# Patient Record
Sex: MALE | Race: WHITE | NOT HISPANIC OR LATINO | Employment: OTHER | URBAN - METROPOLITAN AREA
[De-identification: names, ages, dates, MRNs, and addresses within clinical notes are randomized per-mention and may not be internally consistent; named-entity substitution may affect disease eponyms.]

---

## 2020-01-01 ENCOUNTER — APPOINTMENT (OUTPATIENT)
Dept: GENERAL RADIOLOGY | Facility: HOSPITAL | Age: 85
End: 2020-01-01

## 2020-01-01 ENCOUNTER — APPOINTMENT (OUTPATIENT)
Dept: CT IMAGING | Facility: HOSPITAL | Age: 85
End: 2020-01-01

## 2020-01-01 ENCOUNTER — HOSPITAL ENCOUNTER (INPATIENT)
Facility: HOSPITAL | Age: 85
LOS: 3 days | End: 2020-03-23
Attending: INTERNAL MEDICINE | Admitting: INTERNAL MEDICINE

## 2020-01-01 ENCOUNTER — HOSPITAL ENCOUNTER (INPATIENT)
Facility: HOSPITAL | Age: 85
LOS: 4 days | End: 2020-03-20
Attending: EMERGENCY MEDICINE | Admitting: INTERNAL MEDICINE

## 2020-01-01 VITALS
HEIGHT: 65 IN | WEIGHT: 120 LBS | DIASTOLIC BLOOD PRESSURE: 96 MMHG | SYSTOLIC BLOOD PRESSURE: 142 MMHG | TEMPERATURE: 97.7 F | BODY MASS INDEX: 19.99 KG/M2 | OXYGEN SATURATION: 98 % | RESPIRATION RATE: 16 BRPM | HEART RATE: 117 BPM

## 2020-01-01 VITALS — OXYGEN SATURATION: 96 % | SYSTOLIC BLOOD PRESSURE: 111 MMHG | DIASTOLIC BLOOD PRESSURE: 66 MMHG | TEMPERATURE: 100.6 F

## 2020-01-01 DIAGNOSIS — G91.9 HYDROCEPHALUS, UNSPECIFIED TYPE (HCC): ICD-10-CM

## 2020-01-01 DIAGNOSIS — R79.89 ELEVATED LACTIC ACID LEVEL: ICD-10-CM

## 2020-01-01 DIAGNOSIS — I60.9 SAH (SUBARACHNOID HEMORRHAGE) (HCC): Primary | ICD-10-CM

## 2020-01-01 DIAGNOSIS — D72.829 LEUKOCYTOSIS, UNSPECIFIED TYPE: ICD-10-CM

## 2020-01-01 DIAGNOSIS — R73.9 HYPERGLYCEMIA: ICD-10-CM

## 2020-01-01 DIAGNOSIS — R77.8 ELEVATED TROPONIN: ICD-10-CM

## 2020-01-01 DIAGNOSIS — D64.9 CHRONIC ANEMIA: ICD-10-CM

## 2020-01-01 LAB
ABO GROUP BLD: NORMAL
ALBUMIN SERPL-MCNC: 3.4 G/DL (ref 3.5–5.2)
ALBUMIN/GLOB SERPL: 1.4 G/DL
ALP SERPL-CCNC: 104 U/L (ref 39–117)
ALT SERPL W P-5'-P-CCNC: 37 U/L (ref 1–41)
ANION GAP SERPL CALCULATED.3IONS-SCNC: 11.7 MMOL/L (ref 5–15)
APTT PPP: 31.7 SECONDS (ref 22.7–35.4)
AST SERPL-CCNC: 37 U/L (ref 1–40)
BASOPHILS # BLD AUTO: 0.08 10*3/MM3 (ref 0–0.2)
BASOPHILS NFR BLD AUTO: 0.5 % (ref 0–1.5)
BILIRUB SERPL-MCNC: 0.4 MG/DL (ref 0.2–1.2)
BLD GP AB SCN SERPL QL: NEGATIVE
BUN BLD-MCNC: 15 MG/DL (ref 8–23)
BUN/CREAT SERPL: 14.7 (ref 7–25)
CALCIUM SPEC-SCNC: 8.9 MG/DL (ref 8.6–10.5)
CHLORIDE SERPL-SCNC: 104 MMOL/L (ref 98–107)
CO2 SERPL-SCNC: 25.3 MMOL/L (ref 22–29)
CREAT BLD-MCNC: 1.02 MG/DL (ref 0.76–1.27)
D-LACTATE SERPL-SCNC: 3.5 MMOL/L (ref 0.5–2)
DEPRECATED RDW RBC AUTO: 47.7 FL (ref 37–54)
EOSINOPHIL # BLD AUTO: 0.1 10*3/MM3 (ref 0–0.4)
EOSINOPHIL NFR BLD AUTO: 0.7 % (ref 0.3–6.2)
ERYTHROCYTE [DISTWIDTH] IN BLOOD BY AUTOMATED COUNT: 14 % (ref 12.3–15.4)
GFR SERPL CREATININE-BSD FRML MDRD: 69 ML/MIN/1.73
GLOBULIN UR ELPH-MCNC: 2.5 GM/DL
GLUCOSE BLD-MCNC: 220 MG/DL (ref 65–99)
GLUCOSE BLDC GLUCOMTR-MCNC: 199 MG/DL (ref 70–130)
HCT VFR BLD AUTO: 36.2 % (ref 37.5–51)
HGB BLD-MCNC: 12.1 G/DL (ref 13–17.7)
IMM GRANULOCYTES # BLD AUTO: 0.21 10*3/MM3 (ref 0–0.05)
IMM GRANULOCYTES NFR BLD AUTO: 1.4 % (ref 0–0.5)
INR PPP: 1.08 (ref 0.9–1.1)
LACTATE HOLD SPECIMEN: NORMAL
LYMPHOCYTES # BLD AUTO: 2.51 10*3/MM3 (ref 0.7–3.1)
LYMPHOCYTES NFR BLD AUTO: 16.9 % (ref 19.6–45.3)
MCH RBC QN AUTO: 31.8 PG (ref 26.6–33)
MCHC RBC AUTO-ENTMCNC: 33.4 G/DL (ref 31.5–35.7)
MCV RBC AUTO: 95.3 FL (ref 79–97)
MONOCYTES # BLD AUTO: 1.29 10*3/MM3 (ref 0.1–0.9)
MONOCYTES NFR BLD AUTO: 8.7 % (ref 5–12)
NEUTROPHILS # BLD AUTO: 10.67 10*3/MM3 (ref 1.7–7)
NEUTROPHILS NFR BLD AUTO: 71.8 % (ref 42.7–76)
NRBC BLD AUTO-RTO: 0.1 /100 WBC (ref 0–0.2)
PLATELET # BLD AUTO: 228 10*3/MM3 (ref 140–450)
PMV BLD AUTO: 9.6 FL (ref 6–12)
POTASSIUM BLD-SCNC: 3.9 MMOL/L (ref 3.5–5.2)
PROT SERPL-MCNC: 5.9 G/DL (ref 6–8.5)
PROTHROMBIN TIME: 13.7 SECONDS (ref 11.7–14.2)
RBC # BLD AUTO: 3.8 10*6/MM3 (ref 4.14–5.8)
RH BLD: POSITIVE
SODIUM BLD-SCNC: 141 MMOL/L (ref 136–145)
T&S EXPIRATION DATE: NORMAL
TROPONIN T SERPL-MCNC: 0.17 NG/ML (ref 0–0.03)
WBC NRBC COR # BLD: 14.86 10*3/MM3 (ref 3.4–10.8)

## 2020-01-01 PROCEDURE — 85610 PROTHROMBIN TIME: CPT | Performed by: EMERGENCY MEDICINE

## 2020-01-01 PROCEDURE — 25010000002 MORPHINE PER 10 MG: Performed by: INTERNAL MEDICINE

## 2020-01-01 PROCEDURE — 92526 ORAL FUNCTION THERAPY: CPT

## 2020-01-01 PROCEDURE — 25010000002 LORAZEPAM PER 2 MG: Performed by: INTERNAL MEDICINE

## 2020-01-01 PROCEDURE — 86850 RBC ANTIBODY SCREEN: CPT | Performed by: EMERGENCY MEDICINE

## 2020-01-01 PROCEDURE — 85025 COMPLETE CBC W/AUTO DIFF WBC: CPT | Performed by: EMERGENCY MEDICINE

## 2020-01-01 PROCEDURE — 99222 1ST HOSP IP/OBS MODERATE 55: CPT | Performed by: PSYCHIATRY & NEUROLOGY

## 2020-01-01 PROCEDURE — 71045 X-RAY EXAM CHEST 1 VIEW: CPT

## 2020-01-01 PROCEDURE — 93005 ELECTROCARDIOGRAM TRACING: CPT | Performed by: EMERGENCY MEDICINE

## 2020-01-01 PROCEDURE — 70450 CT HEAD/BRAIN W/O DYE: CPT

## 2020-01-01 PROCEDURE — 83605 ASSAY OF LACTIC ACID: CPT | Performed by: EMERGENCY MEDICINE

## 2020-01-01 PROCEDURE — 84484 ASSAY OF TROPONIN QUANT: CPT | Performed by: EMERGENCY MEDICINE

## 2020-01-01 PROCEDURE — 99284 EMERGENCY DEPT VISIT MOD MDM: CPT | Performed by: NURSE PRACTITIONER

## 2020-01-01 PROCEDURE — 86901 BLOOD TYPING SEROLOGIC RH(D): CPT | Performed by: EMERGENCY MEDICINE

## 2020-01-01 PROCEDURE — 86900 BLOOD TYPING SEROLOGIC ABO: CPT | Performed by: EMERGENCY MEDICINE

## 2020-01-01 PROCEDURE — 85730 THROMBOPLASTIN TIME PARTIAL: CPT | Performed by: EMERGENCY MEDICINE

## 2020-01-01 PROCEDURE — 99231 SBSQ HOSP IP/OBS SF/LOW 25: CPT | Performed by: INTERNAL MEDICINE

## 2020-01-01 PROCEDURE — 99238 HOSP IP/OBS DSCHRG MGMT 30/<: CPT | Performed by: INTERNAL MEDICINE

## 2020-01-01 PROCEDURE — 99232 SBSQ HOSP IP/OBS MODERATE 35: CPT | Performed by: INTERNAL MEDICINE

## 2020-01-01 PROCEDURE — 99239 HOSP IP/OBS DSCHRG MGMT >30: CPT | Performed by: INTERNAL MEDICINE

## 2020-01-01 PROCEDURE — 80053 COMPREHEN METABOLIC PANEL: CPT | Performed by: EMERGENCY MEDICINE

## 2020-01-01 PROCEDURE — 99285 EMERGENCY DEPT VISIT HI MDM: CPT

## 2020-01-01 PROCEDURE — 99222 1ST HOSP IP/OBS MODERATE 55: CPT | Performed by: INTERNAL MEDICINE

## 2020-01-01 PROCEDURE — 93010 ELECTROCARDIOGRAM REPORT: CPT | Performed by: INTERNAL MEDICINE

## 2020-01-01 PROCEDURE — 99221 1ST HOSP IP/OBS SF/LOW 40: CPT | Performed by: INTERNAL MEDICINE

## 2020-01-01 PROCEDURE — 92610 EVALUATE SWALLOWING FUNCTION: CPT

## 2020-01-01 PROCEDURE — 82962 GLUCOSE BLOOD TEST: CPT

## 2020-01-01 RX ORDER — PANTOPRAZOLE SODIUM 40 MG/1
40 TABLET, DELAYED RELEASE ORAL EVERY MORNING
Status: CANCELLED | OUTPATIENT
Start: 2020-01-01

## 2020-01-01 RX ORDER — MORPHINE SULFATE 20 MG/ML
10 SOLUTION ORAL
Status: CANCELLED | OUTPATIENT
Start: 2020-01-01 | End: 2020-03-26

## 2020-01-01 RX ORDER — MORPHINE SULFATE 10 MG/ML
6 INJECTION INTRAMUSCULAR; INTRAVENOUS; SUBCUTANEOUS
Status: DISCONTINUED | OUTPATIENT
Start: 2020-01-01 | End: 2020-03-24 | Stop reason: HOSPADM

## 2020-01-01 RX ORDER — LORAZEPAM 2 MG/ML
2 CONCENTRATE ORAL
Status: DISCONTINUED | OUTPATIENT
Start: 2020-01-01 | End: 2020-01-01 | Stop reason: HOSPADM

## 2020-01-01 RX ORDER — LORAZEPAM 2 MG/ML
0.5 CONCENTRATE ORAL
Status: DISCONTINUED | OUTPATIENT
Start: 2020-01-01 | End: 2020-03-24 | Stop reason: HOSPADM

## 2020-01-01 RX ORDER — LEVOTHYROXINE SODIUM 0.07 MG/1
75 TABLET ORAL
Status: DISCONTINUED | OUTPATIENT
Start: 2020-01-01 | End: 2020-01-01 | Stop reason: HOSPADM

## 2020-01-01 RX ORDER — MORPHINE SULFATE 4 MG/ML
4 INJECTION, SOLUTION INTRAMUSCULAR; INTRAVENOUS
Status: CANCELLED | OUTPATIENT
Start: 2020-01-01 | End: 2020-03-26

## 2020-01-01 RX ORDER — HYDROMORPHONE HYDROCHLORIDE 1 MG/ML
0.5 INJECTION, SOLUTION INTRAMUSCULAR; INTRAVENOUS; SUBCUTANEOUS
Status: DISCONTINUED | OUTPATIENT
Start: 2020-01-01 | End: 2020-03-24 | Stop reason: HOSPADM

## 2020-01-01 RX ORDER — MORPHINE SULFATE 2 MG/ML
4 INJECTION, SOLUTION INTRAMUSCULAR; INTRAVENOUS ONCE
Status: COMPLETED | OUTPATIENT
Start: 2020-01-01 | End: 2020-01-01

## 2020-01-01 RX ORDER — MORPHINE SULFATE 20 MG/ML
5 SOLUTION ORAL
Status: CANCELLED | OUTPATIENT
Start: 2020-01-01 | End: 2020-03-26

## 2020-01-01 RX ORDER — MORPHINE SULFATE 10 MG/ML
6 INJECTION INTRAMUSCULAR; INTRAVENOUS; SUBCUTANEOUS
Status: DISCONTINUED | OUTPATIENT
Start: 2020-01-01 | End: 2020-01-01 | Stop reason: HOSPADM

## 2020-01-01 RX ORDER — LORAZEPAM 2 MG/ML
1 INJECTION INTRAMUSCULAR
Status: DISCONTINUED | OUTPATIENT
Start: 2020-01-01 | End: 2020-03-24 | Stop reason: HOSPADM

## 2020-01-01 RX ORDER — ACETAMINOPHEN 160 MG/5ML
650 SOLUTION ORAL EVERY 4 HOURS PRN
Status: DISCONTINUED | OUTPATIENT
Start: 2020-01-01 | End: 2020-03-24 | Stop reason: HOSPADM

## 2020-01-01 RX ORDER — LORAZEPAM 2 MG/ML
1 INJECTION INTRAMUSCULAR
Status: CANCELLED | OUTPATIENT
Start: 2020-01-01 | End: 2020-03-26

## 2020-01-01 RX ORDER — SCOLOPAMINE TRANSDERMAL SYSTEM 1 MG/1
1 PATCH, EXTENDED RELEASE TRANSDERMAL
Status: DISCONTINUED | OUTPATIENT
Start: 2020-01-01 | End: 2020-01-01 | Stop reason: HOSPADM

## 2020-01-01 RX ORDER — LORAZEPAM 2 MG/ML
2 CONCENTRATE ORAL
Status: DISCONTINUED | OUTPATIENT
Start: 2020-01-01 | End: 2020-03-24 | Stop reason: HOSPADM

## 2020-01-01 RX ORDER — CETIRIZINE HYDROCHLORIDE 10 MG/1
10 TABLET ORAL DAILY
Status: ON HOLD | COMMUNITY
End: 2020-01-01

## 2020-01-01 RX ORDER — TAMSULOSIN HYDROCHLORIDE 0.4 MG/1
0.4 CAPSULE ORAL DAILY
Status: CANCELLED | OUTPATIENT
Start: 2020-01-01

## 2020-01-01 RX ORDER — MORPHINE SULFATE 2 MG/ML
2 INJECTION, SOLUTION INTRAMUSCULAR; INTRAVENOUS
Status: DISCONTINUED | OUTPATIENT
Start: 2020-01-01 | End: 2020-01-01 | Stop reason: HOSPADM

## 2020-01-01 RX ORDER — LORAZEPAM 2 MG/ML
1 CONCENTRATE ORAL
Status: CANCELLED | OUTPATIENT
Start: 2020-01-01 | End: 2020-03-26

## 2020-01-01 RX ORDER — SCOLOPAMINE TRANSDERMAL SYSTEM 1 MG/1
1 PATCH, EXTENDED RELEASE TRANSDERMAL
Status: CANCELLED | OUTPATIENT
Start: 2020-01-01

## 2020-01-01 RX ORDER — LORAZEPAM 2 MG/ML
1 CONCENTRATE ORAL
Status: DISCONTINUED | OUTPATIENT
Start: 2020-01-01 | End: 2020-03-24 | Stop reason: HOSPADM

## 2020-01-01 RX ORDER — ACETAMINOPHEN 325 MG/1
650 TABLET ORAL EVERY 4 HOURS PRN
Status: DISCONTINUED | OUTPATIENT
Start: 2020-01-01 | End: 2020-03-24 | Stop reason: HOSPADM

## 2020-01-01 RX ORDER — LEVOTHYROXINE SODIUM 0.07 MG/1
75 TABLET ORAL
Status: CANCELLED | OUTPATIENT
Start: 2020-01-01

## 2020-01-01 RX ORDER — MORPHINE SULFATE 2 MG/ML
2 INJECTION, SOLUTION INTRAMUSCULAR; INTRAVENOUS
Status: CANCELLED | OUTPATIENT
Start: 2020-01-01 | End: 2020-03-26

## 2020-01-01 RX ORDER — LORAZEPAM 2 MG/ML
2 INJECTION INTRAMUSCULAR
Status: DISCONTINUED | OUTPATIENT
Start: 2020-01-01 | End: 2020-03-24 | Stop reason: HOSPADM

## 2020-01-01 RX ORDER — LORAZEPAM 2 MG/ML
2 INJECTION INTRAMUSCULAR
Status: CANCELLED | OUTPATIENT
Start: 2020-01-01 | End: 2020-03-26

## 2020-01-01 RX ORDER — HYDROMORPHONE HYDROCHLORIDE 1 MG/ML
0.5 INJECTION, SOLUTION INTRAMUSCULAR; INTRAVENOUS; SUBCUTANEOUS
Status: DISCONTINUED | OUTPATIENT
Start: 2020-01-01 | End: 2020-01-01 | Stop reason: HOSPADM

## 2020-01-01 RX ORDER — ACETAMINOPHEN 160 MG/5ML
650 SOLUTION ORAL EVERY 4 HOURS PRN
Status: DISCONTINUED | OUTPATIENT
Start: 2020-01-01 | End: 2020-01-01 | Stop reason: HOSPADM

## 2020-01-01 RX ORDER — SODIUM CHLORIDE 0.9 % (FLUSH) 0.9 %
10 SYRINGE (ML) INJECTION AS NEEDED
Status: DISCONTINUED | OUTPATIENT
Start: 2020-01-01 | End: 2020-01-01 | Stop reason: HOSPADM

## 2020-01-01 RX ORDER — SENNA AND DOCUSATE SODIUM 50; 8.6 MG/1; MG/1
2 TABLET, FILM COATED ORAL NIGHTLY
Status: DISCONTINUED | OUTPATIENT
Start: 2020-01-01 | End: 2020-01-01

## 2020-01-01 RX ORDER — MORPHINE SULFATE 20 MG/ML
5 SOLUTION ORAL
Status: DISCONTINUED | OUTPATIENT
Start: 2020-01-01 | End: 2020-01-01 | Stop reason: HOSPADM

## 2020-01-01 RX ORDER — LORAZEPAM 2 MG/ML
0.5 INJECTION INTRAMUSCULAR
Status: DISCONTINUED | OUTPATIENT
Start: 2020-01-01 | End: 2020-01-01 | Stop reason: HOSPADM

## 2020-01-01 RX ORDER — LORAZEPAM 2 MG/ML
1 INJECTION INTRAMUSCULAR
Status: DISCONTINUED | OUTPATIENT
Start: 2020-01-01 | End: 2020-01-01 | Stop reason: HOSPADM

## 2020-01-01 RX ORDER — HYDROMORPHONE HCL 110MG/55ML
1.5 PATIENT CONTROLLED ANALGESIA SYRINGE INTRAVENOUS
Status: DISCONTINUED | OUTPATIENT
Start: 2020-01-01 | End: 2020-03-24 | Stop reason: HOSPADM

## 2020-01-01 RX ORDER — ACETAMINOPHEN 325 MG/1
650 TABLET ORAL EVERY 4 HOURS PRN
Status: CANCELLED | OUTPATIENT
Start: 2020-01-01

## 2020-01-01 RX ORDER — ACETAMINOPHEN 650 MG/1
650 SUPPOSITORY RECTAL EVERY 4 HOURS PRN
Status: DISCONTINUED | OUTPATIENT
Start: 2020-01-01 | End: 2020-01-01 | Stop reason: HOSPADM

## 2020-01-01 RX ORDER — MORPHINE SULFATE 2 MG/ML
2 INJECTION, SOLUTION INTRAMUSCULAR; INTRAVENOUS
Status: DISCONTINUED | OUTPATIENT
Start: 2020-01-01 | End: 2020-03-24 | Stop reason: HOSPADM

## 2020-01-01 RX ORDER — LORAZEPAM 2 MG/ML
0.5 INJECTION INTRAMUSCULAR
Status: DISCONTINUED | OUTPATIENT
Start: 2020-01-01 | End: 2020-03-24 | Stop reason: HOSPADM

## 2020-01-01 RX ORDER — DIPHENOXYLATE HYDROCHLORIDE AND ATROPINE SULFATE 2.5; .025 MG/1; MG/1
1 TABLET ORAL
Status: CANCELLED | OUTPATIENT
Start: 2020-01-01

## 2020-01-01 RX ORDER — MORPHINE SULFATE 2 MG/ML
4 INJECTION, SOLUTION INTRAMUSCULAR; INTRAVENOUS
Status: DISCONTINUED | OUTPATIENT
Start: 2020-01-01 | End: 2020-01-01 | Stop reason: HOSPADM

## 2020-01-01 RX ORDER — AMOXICILLIN 250 MG
2 CAPSULE ORAL DAILY
Status: CANCELLED | OUTPATIENT
Start: 2020-01-01

## 2020-01-01 RX ORDER — LORAZEPAM 2 MG/ML
0.5 INJECTION INTRAMUSCULAR
Status: CANCELLED | OUTPATIENT
Start: 2020-01-01 | End: 2020-03-26

## 2020-01-01 RX ORDER — MORPHINE SULFATE 4 MG/ML
4 INJECTION, SOLUTION INTRAMUSCULAR; INTRAVENOUS
Status: DISCONTINUED | OUTPATIENT
Start: 2020-01-01 | End: 2020-03-24 | Stop reason: HOSPADM

## 2020-01-01 RX ORDER — POLYETHYLENE GLYCOL 3350 17 G/17G
17 POWDER, FOR SOLUTION ORAL DAILY
Status: CANCELLED | OUTPATIENT
Start: 2020-01-01

## 2020-01-01 RX ORDER — MORPHINE SULFATE 20 MG/ML
5 SOLUTION ORAL
Status: DISCONTINUED | OUTPATIENT
Start: 2020-01-01 | End: 2020-03-24 | Stop reason: HOSPADM

## 2020-01-01 RX ORDER — MORPHINE SULFATE 20 MG/ML
20 SOLUTION ORAL
Status: DISCONTINUED | OUTPATIENT
Start: 2020-01-01 | End: 2020-03-24 | Stop reason: HOSPADM

## 2020-01-01 RX ORDER — DIPHENOXYLATE HYDROCHLORIDE AND ATROPINE SULFATE 2.5; .025 MG/1; MG/1
1 TABLET ORAL
Status: DISCONTINUED | OUTPATIENT
Start: 2020-01-01 | End: 2020-01-01 | Stop reason: HOSPADM

## 2020-01-01 RX ORDER — HYDROMORPHONE HCL 110MG/55ML
1.5 PATIENT CONTROLLED ANALGESIA SYRINGE INTRAVENOUS
Status: CANCELLED | OUTPATIENT
Start: 2020-01-01 | End: 2020-03-26

## 2020-01-01 RX ORDER — LORAZEPAM 2 MG/ML
0.5 CONCENTRATE ORAL
Status: CANCELLED | OUTPATIENT
Start: 2020-01-01 | End: 2020-03-26

## 2020-01-01 RX ORDER — LORAZEPAM 2 MG/ML
2 INJECTION INTRAMUSCULAR
Status: DISCONTINUED | OUTPATIENT
Start: 2020-01-01 | End: 2020-01-01 | Stop reason: HOSPADM

## 2020-01-01 RX ORDER — DIPHENOXYLATE HYDROCHLORIDE AND ATROPINE SULFATE 2.5; .025 MG/1; MG/1
1 TABLET ORAL
Status: DISCONTINUED | OUTPATIENT
Start: 2020-01-01 | End: 2020-03-24 | Stop reason: HOSPADM

## 2020-01-01 RX ORDER — AMOXICILLIN 250 MG
2 CAPSULE ORAL DAILY
Status: DISCONTINUED | OUTPATIENT
Start: 2020-01-01 | End: 2020-01-01 | Stop reason: HOSPADM

## 2020-01-01 RX ORDER — POLYETHYLENE GLYCOL 3350 17 G/17G
17 POWDER, FOR SOLUTION ORAL DAILY
Status: DISCONTINUED | OUTPATIENT
Start: 2020-01-01 | End: 2020-01-01

## 2020-01-01 RX ORDER — FAMOTIDINE 10 MG/ML
20 INJECTION, SOLUTION INTRAVENOUS EVERY 12 HOURS SCHEDULED
Status: DISCONTINUED | OUTPATIENT
Start: 2020-01-01 | End: 2020-01-01

## 2020-01-01 RX ORDER — MORPHINE SULFATE 20 MG/ML
10 SOLUTION ORAL
Status: DISCONTINUED | OUTPATIENT
Start: 2020-01-01 | End: 2020-01-01 | Stop reason: HOSPADM

## 2020-01-01 RX ORDER — MORPHINE SULFATE 20 MG/ML
20 SOLUTION ORAL
Status: CANCELLED | OUTPATIENT
Start: 2020-01-01 | End: 2020-03-26

## 2020-01-01 RX ORDER — LORAZEPAM 2 MG/ML
1 CONCENTRATE ORAL
Status: DISCONTINUED | OUTPATIENT
Start: 2020-01-01 | End: 2020-01-01 | Stop reason: HOSPADM

## 2020-01-01 RX ORDER — ACETAMINOPHEN 650 MG/1
650 SUPPOSITORY RECTAL EVERY 4 HOURS PRN
Status: CANCELLED | OUTPATIENT
Start: 2020-01-01

## 2020-01-01 RX ORDER — ACETAMINOPHEN 325 MG/1
650 TABLET ORAL EVERY 4 HOURS PRN
Status: DISCONTINUED | OUTPATIENT
Start: 2020-01-01 | End: 2020-01-01 | Stop reason: HOSPADM

## 2020-01-01 RX ORDER — HYDROMORPHONE HYDROCHLORIDE 1 MG/ML
0.5 INJECTION, SOLUTION INTRAMUSCULAR; INTRAVENOUS; SUBCUTANEOUS
Status: CANCELLED | OUTPATIENT
Start: 2020-01-01 | End: 2020-03-26

## 2020-01-01 RX ORDER — SCOLOPAMINE TRANSDERMAL SYSTEM 1 MG/1
1 PATCH, EXTENDED RELEASE TRANSDERMAL
Status: DISCONTINUED | OUTPATIENT
Start: 2020-01-01 | End: 2020-03-24 | Stop reason: HOSPADM

## 2020-01-01 RX ORDER — MORPHINE SULFATE 20 MG/ML
20 SOLUTION ORAL
Status: DISCONTINUED | OUTPATIENT
Start: 2020-01-01 | End: 2020-01-01 | Stop reason: HOSPADM

## 2020-01-01 RX ORDER — ACETAMINOPHEN 160 MG/5ML
650 SOLUTION ORAL EVERY 4 HOURS PRN
Status: CANCELLED | OUTPATIENT
Start: 2020-01-01

## 2020-01-01 RX ORDER — LORAZEPAM 2 MG/ML
2 CONCENTRATE ORAL
Status: CANCELLED | OUTPATIENT
Start: 2020-01-01 | End: 2020-03-26

## 2020-01-01 RX ORDER — TAMSULOSIN HYDROCHLORIDE 0.4 MG/1
0.4 CAPSULE ORAL DAILY
Status: DISCONTINUED | OUTPATIENT
Start: 2020-01-01 | End: 2020-01-01 | Stop reason: HOSPADM

## 2020-01-01 RX ORDER — SODIUM CHLORIDE 0.9 % (FLUSH) 0.9 %
10 SYRINGE (ML) INJECTION AS NEEDED
Status: CANCELLED | OUTPATIENT
Start: 2020-01-01

## 2020-01-01 RX ORDER — MORPHINE SULFATE 10 MG/ML
6 INJECTION INTRAMUSCULAR; INTRAVENOUS; SUBCUTANEOUS
Status: CANCELLED | OUTPATIENT
Start: 2020-01-01 | End: 2020-03-26

## 2020-01-01 RX ORDER — PANTOPRAZOLE SODIUM 40 MG/1
40 TABLET, DELAYED RELEASE ORAL EVERY MORNING
Status: DISCONTINUED | OUTPATIENT
Start: 2020-01-01 | End: 2020-01-01 | Stop reason: HOSPADM

## 2020-01-01 RX ORDER — LORAZEPAM 2 MG/ML
0.5 CONCENTRATE ORAL
Status: DISCONTINUED | OUTPATIENT
Start: 2020-01-01 | End: 2020-01-01 | Stop reason: HOSPADM

## 2020-01-01 RX ORDER — MORPHINE SULFATE 20 MG/ML
10 SOLUTION ORAL
Status: DISCONTINUED | OUTPATIENT
Start: 2020-01-01 | End: 2020-03-24 | Stop reason: HOSPADM

## 2020-01-01 RX ORDER — ACETAMINOPHEN 650 MG/1
650 SUPPOSITORY RECTAL EVERY 4 HOURS PRN
Status: DISCONTINUED | OUTPATIENT
Start: 2020-01-01 | End: 2020-03-24 | Stop reason: HOSPADM

## 2020-01-01 RX ADMIN — LEVOTHYROXINE SODIUM 75 MCG: 75 TABLET ORAL at 06:41

## 2020-01-01 RX ADMIN — GLYCOPYRROLATE 0.4 MG: 0.2 INJECTION, SOLUTION INTRAMUSCULAR; INTRAVITREAL at 09:34

## 2020-01-01 RX ADMIN — MORPHINE SULFATE 10 MG: 100 SOLUTION ORAL at 23:10

## 2020-01-01 RX ADMIN — LEVOTHYROXINE SODIUM 75 MCG: 75 TABLET ORAL at 06:17

## 2020-01-01 RX ADMIN — MORPHINE SULFATE 4 MG: 4 INJECTION INTRAVENOUS at 12:30

## 2020-01-01 RX ADMIN — MORPHINE SULFATE 2 MG: 2 INJECTION, SOLUTION INTRAMUSCULAR; INTRAVENOUS at 20:50

## 2020-01-01 RX ADMIN — MORPHINE SULFATE 4 MG: 2 INJECTION, SOLUTION INTRAMUSCULAR; INTRAVENOUS at 16:31

## 2020-01-01 RX ADMIN — GLYCOPYRROLATE 0.4 MG: 0.2 INJECTION, SOLUTION INTRAMUSCULAR; INTRAVITREAL at 20:50

## 2020-01-01 RX ADMIN — POLYETHYLENE GLYCOL 3350 17 G: 17 POWDER, FOR SOLUTION ORAL at 14:19

## 2020-01-01 RX ADMIN — MORPHINE SULFATE 2 MG: 2 INJECTION, SOLUTION INTRAMUSCULAR; INTRAVENOUS at 00:32

## 2020-01-01 RX ADMIN — MORPHINE SULFATE 2 MG: 2 INJECTION, SOLUTION INTRAMUSCULAR; INTRAVENOUS at 19:50

## 2020-01-01 RX ADMIN — MORPHINE SULFATE 4 MG: 4 INJECTION INTRAVENOUS at 02:23

## 2020-01-01 RX ADMIN — TAMSULOSIN HYDROCHLORIDE 0.4 MG: 0.4 CAPSULE ORAL at 08:52

## 2020-01-01 RX ADMIN — GLYCOPYRROLATE 0.4 MG: 0.2 INJECTION, SOLUTION INTRAMUSCULAR; INTRAVITREAL at 18:37

## 2020-01-01 RX ADMIN — MORPHINE SULFATE 4 MG: 4 INJECTION INTRAVENOUS at 16:55

## 2020-01-01 RX ADMIN — SODIUM CHLORIDE 5 MG/HR: 9 INJECTION, SOLUTION INTRAVENOUS at 07:09

## 2020-01-01 RX ADMIN — GLYCOPYRROLATE 0.4 MG: 0.2 INJECTION, SOLUTION INTRAMUSCULAR; INTRAVITREAL at 14:37

## 2020-01-01 RX ADMIN — MORPHINE SULFATE 2 MG: 2 INJECTION, SOLUTION INTRAMUSCULAR; INTRAVENOUS at 08:17

## 2020-01-01 RX ADMIN — GLYCOPYRROLATE 0.4 MG: 0.2 INJECTION, SOLUTION INTRAMUSCULAR; INTRAVITREAL at 16:54

## 2020-01-01 RX ADMIN — TAMSULOSIN HYDROCHLORIDE 0.4 MG: 0.4 CAPSULE ORAL at 10:19

## 2020-01-01 RX ADMIN — GLYCOPYRROLATE 0.4 MG: 0.2 INJECTION, SOLUTION INTRAMUSCULAR; INTRAVITREAL at 11:19

## 2020-01-01 RX ADMIN — MORPHINE SULFATE 2 MG: 2 INJECTION, SOLUTION INTRAMUSCULAR; INTRAVENOUS at 05:03

## 2020-01-01 RX ADMIN — MORPHINE SULFATE 10 MG: 100 SOLUTION ORAL at 23:38

## 2020-01-01 RX ADMIN — MORPHINE SULFATE 4 MG: 4 INJECTION INTRAVENOUS at 16:00

## 2020-01-01 RX ADMIN — LORAZEPAM 0.5 MG: 2 INJECTION INTRAMUSCULAR; INTRAVENOUS at 02:23

## 2020-01-01 RX ADMIN — LORAZEPAM 0.5 MG: 2 INJECTION INTRAMUSCULAR; INTRAVENOUS at 19:35

## 2020-01-01 RX ADMIN — LORAZEPAM 2 MG: 2 INJECTION INTRAMUSCULAR; INTRAVENOUS at 16:54

## 2020-01-01 RX ADMIN — GLYCOPYRROLATE 0.4 MG: 0.2 INJECTION, SOLUTION INTRAMUSCULAR; INTRAVITREAL at 05:02

## 2020-01-01 RX ADMIN — GLYCOPYRROLATE 0.4 MG: 0.2 INJECTION, SOLUTION INTRAMUSCULAR; INTRAVITREAL at 20:45

## 2020-01-01 RX ADMIN — GLYCOPYRROLATE 0.4 MG: 0.2 INJECTION, SOLUTION INTRAMUSCULAR; INTRAVITREAL at 09:56

## 2020-01-01 RX ADMIN — MORPHINE SULFATE 4 MG: 4 INJECTION INTRAVENOUS at 03:20

## 2020-01-01 RX ADMIN — MORPHINE SULFATE 4 MG: 4 INJECTION INTRAVENOUS at 09:34

## 2020-01-01 RX ADMIN — LORAZEPAM 1 MG: 2 INJECTION INTRAMUSCULAR; INTRAVENOUS at 12:30

## 2020-01-01 RX ADMIN — LORAZEPAM 0.5 MG: 2 INJECTION INTRAMUSCULAR; INTRAVENOUS at 15:27

## 2020-01-01 RX ADMIN — LORAZEPAM 0.5 MG: 2 INJECTION INTRAMUSCULAR; INTRAVENOUS at 05:03

## 2020-01-01 RX ADMIN — ACETAMINOPHEN 650 MG: 650 SUPPOSITORY RECTAL at 12:48

## 2020-01-01 RX ADMIN — MORPHINE SULFATE 10 MG: 100 SOLUTION ORAL at 18:51

## 2020-01-01 RX ADMIN — MORPHINE SULFATE 4 MG: 4 INJECTION INTRAVENOUS at 11:19

## 2020-01-01 RX ADMIN — MORPHINE SULFATE 10 MG: 100 SOLUTION ORAL at 21:21

## 2020-01-01 RX ADMIN — MORPHINE SULFATE 4 MG: 4 INJECTION INTRAVENOUS at 15:00

## 2020-01-01 RX ADMIN — FAMOTIDINE 20 MG: 10 INJECTION INTRAVENOUS at 08:17

## 2020-01-01 RX ADMIN — SCOPALAMINE 1 PATCH: 1 PATCH, EXTENDED RELEASE TRANSDERMAL at 12:31

## 2020-01-01 RX ADMIN — LORAZEPAM 1 MG: 2 INJECTION INTRAMUSCULAR; INTRAVENOUS at 16:31

## 2020-01-01 RX ADMIN — LORAZEPAM 0.5 MG: 2 INJECTION INTRAMUSCULAR; INTRAVENOUS at 16:00

## 2020-01-01 RX ADMIN — MORPHINE SULFATE 4 MG: 2 INJECTION, SOLUTION INTRAMUSCULAR; INTRAVENOUS at 09:25

## 2020-01-01 RX ADMIN — LORAZEPAM 0.5 MG: 2 INJECTION INTRAMUSCULAR; INTRAVENOUS at 03:20

## 2020-01-01 RX ADMIN — MORPHINE SULFATE 4 MG: 2 INJECTION, SOLUTION INTRAMUSCULAR; INTRAVENOUS at 01:42

## 2020-01-01 RX ADMIN — GLYCOPYRROLATE 0.4 MG: 0.2 INJECTION, SOLUTION INTRAMUSCULAR; INTRAVITREAL at 12:30

## 2020-01-01 RX ADMIN — PANTOPRAZOLE SODIUM 40 MG: 40 TABLET, DELAYED RELEASE ORAL at 06:41

## 2020-01-01 RX ADMIN — LORAZEPAM 1 MG: 2 INJECTION INTRAMUSCULAR; INTRAVENOUS at 20:50

## 2020-01-01 RX ADMIN — PANTOPRAZOLE SODIUM 40 MG: 40 TABLET, DELAYED RELEASE ORAL at 06:17

## 2020-01-01 RX ADMIN — LORAZEPAM 0.5 MG: 2 INJECTION INTRAMUSCULAR; INTRAVENOUS at 20:45

## 2020-01-01 RX ADMIN — SODIUM CHLORIDE 500 ML: 9 INJECTION, SOLUTION INTRAVENOUS at 07:44

## 2020-01-01 RX ADMIN — MORPHINE SULFATE 2 MG: 2 INJECTION, SOLUTION INTRAMUSCULAR; INTRAVENOUS at 20:45

## 2020-03-16 PROBLEM — I60.9 SAH (SUBARACHNOID HEMORRHAGE) (HCC): Status: ACTIVE | Noted: 2020-01-01

## 2020-03-16 PROBLEM — J96.11 CHRONIC RESPIRATORY FAILURE WITH HYPOXIA (HCC): Status: ACTIVE | Noted: 2020-01-01

## 2020-03-16 PROBLEM — G91.1 ACQUIRED OBSTRUCTIVE HYDROCEPHALUS (HCC): Status: ACTIVE | Noted: 2020-01-01

## 2020-03-16 PROBLEM — I10 HYPERTENSION: Status: ACTIVE | Noted: 2020-01-01

## 2020-03-16 PROBLEM — J44.9 COPD (CHRONIC OBSTRUCTIVE PULMONARY DISEASE) (HCC): Status: ACTIVE | Noted: 2020-01-01

## 2020-03-16 PROBLEM — I60.9: Status: ACTIVE | Noted: 2020-01-01

## 2020-03-16 PROBLEM — Z51.5 PALLIATIVE CARE BY SPECIALIST: Status: ACTIVE | Noted: 2020-01-01

## 2020-03-16 NOTE — ED PROVIDER NOTES
EMERGENCY DEPARTMENT ENCOUNTER    Room Number:  15/15  Date of encounter:  3/16/2020  PCP: Raffi Wooten MD    HPI:  Context: Justin Pederson is a 86 y.o. male who presents to the ED c/o chief complaint of headache.  Per girlfriend at bedside, patient went to the bathroom at approximately 4 AM.  When he did not return, she went to the bathroom and found him laying on the floor.  Patient had lost control of bowels.  Patient was complaining of severe headache and seemed confused.  Girlfriend states patient was at baseline when he went to bed last night, no complaints.  Patient is not on a blood thinner, not on aspirin.    MEDICAL HISTORY REVIEW  Reviewed in EPIC    PAST MEDICAL HISTORY  Active Ambulatory Problems     Diagnosis Date Noted   • No Active Ambulatory Problems     Resolved Ambulatory Problems     Diagnosis Date Noted   • No Resolved Ambulatory Problems     Past Medical History:   Diagnosis Date   • Chronic kidney disease    • COPD (chronic obstructive pulmonary disease) (CMS/[facility])    • Disease of thyroid gland    • Emphysema lung (CMS/[facility])    • GERD (gastroesophageal reflux disease)    • Hypertension    • Psoriasis        PAST SURGICAL HISTORY  Past Surgical History:   Procedure Laterality Date   • BONE MARROW BIOPSY     • COLONOSCOPY     • ENDOSCOPY     • HERNIA REPAIR         FAMILY HISTORY  History reviewed. No pertinent family history.    SOCIAL HISTORY  Social History     Socioeconomic History   • Marital status:      Spouse name: Not on file   • Number of children: Not on file   • Years of education: Not on file   • Highest education level: Not on file   Tobacco Use   • Smoking status: Former Smoker   • Smokeless tobacco: Never Used   Substance and Sexual Activity   • Alcohol use: No     Frequency: Never       ALLERGIES  Penicillins    The patient's allergies have been reviewed    REVIEW OF SYSTEMS  All systems reviewed and negative except for those discussed in HPI.     PHYSICAL EXAM  I have  reviewed the triage vital signs and nursing notes.  ED Triage Vitals [03/16/20 0605]   Temp Heart Rate Resp BP SpO2   98.5 °F (36.9 °C) 89 18 (!) 174/103 93 %      Temp src Heart Rate Source Patient Position BP Location FiO2 (%)   Tympanic Monitor -- -- --     GENERAL: No acute distress  HENT: NCAT, PERRL, no anisocoria, nares patent  EYES: no scleral icterus  NECK: trachea midline, no ROM limitations, cervical spine, no step-offs or deformities, no midline tenderness to palpation  CV: regular rhythm, regular rate  RESPIRATORY: normal effort  ABDOMEN: soft  : deferred  MUSCULOSKELETAL: no deformity  NEURO: alert and oriented x1, speech is slurred and difficult to comprehend but no facial motor deficit, moves all extremities, sensation intact light touch all extremities, follows commands no focal deficit GCS 11 E 3 Verbal 3 Motor 6  SKIN: warm, dry    LAB RESULTS  Recent Results (from the past 24 hour(s))   POC Glucose Once    Collection Time: 03/16/20  6:13 AM   Result Value Ref Range    Glucose 199 (H) 70 - 130 mg/dL   Comprehensive Metabolic Panel    Collection Time: 03/16/20  6:21 AM   Result Value Ref Range    Glucose 220 (H) 65 - 99 mg/dL    BUN 15 8 - 23 mg/dL    Creatinine 1.02 0.76 - 1.27 mg/dL    Sodium 141 136 - 145 mmol/L    Potassium 3.9 3.5 - 5.2 mmol/L    Chloride 104 98 - 107 mmol/L    CO2 25.3 22.0 - 29.0 mmol/L    Calcium 8.9 8.6 - 10.5 mg/dL    Total Protein 5.9 (L) 6.0 - 8.5 g/dL    Albumin 3.40 (L) 3.50 - 5.20 g/dL    ALT (SGPT) 37 1 - 41 U/L    AST (SGOT) 37 1 - 40 U/L    Alkaline Phosphatase 104 39 - 117 U/L    Total Bilirubin 0.4 0.2 - 1.2 mg/dL    eGFR Non African Amer 69 >60 mL/min/1.73    Globulin 2.5 gm/dL    A/G Ratio 1.4 g/dL    BUN/Creatinine Ratio 14.7 7.0 - 25.0    Anion Gap 11.7 5.0 - 15.0 mmol/L   Protime-INR    Collection Time: 03/16/20  6:21 AM   Result Value Ref Range    Protime 13.7 11.7 - 14.2 Seconds    INR 1.08 0.90 - 1.10   aPTT    Collection Time: 03/16/20  6:21 AM    Result Value Ref Range    PTT 31.7 22.7 - 35.4 seconds   Troponin    Collection Time: 03/16/20  6:21 AM   Result Value Ref Range    Troponin T 0.174 (C) 0.000 - 0.030 ng/mL   Type & Screen    Collection Time: 03/16/20  6:21 AM   Result Value Ref Range    ABO Type O     RH type Positive     Antibody Screen Negative     T&S Expiration Date 3/19/2020 11:59:59 PM    CBC Auto Differential    Collection Time: 03/16/20  6:21 AM   Result Value Ref Range    WBC 14.86 (H) 3.40 - 10.80 10*3/mm3    RBC 3.80 (L) 4.14 - 5.80 10*6/mm3    Hemoglobin 12.1 (L) 13.0 - 17.7 g/dL    Hematocrit 36.2 (L) 37.5 - 51.0 %    MCV 95.3 79.0 - 97.0 fL    MCH 31.8 26.6 - 33.0 pg    MCHC 33.4 31.5 - 35.7 g/dL    RDW 14.0 12.3 - 15.4 %    RDW-SD 47.7 37.0 - 54.0 fl    MPV 9.6 6.0 - 12.0 fL    Platelets 228 140 - 450 10*3/mm3    Neutrophil % 71.8 42.7 - 76.0 %    Lymphocyte % 16.9 (L) 19.6 - 45.3 %    Monocyte % 8.7 5.0 - 12.0 %    Eosinophil % 0.7 0.3 - 6.2 %    Basophil % 0.5 0.0 - 1.5 %    Immature Grans % 1.4 (H) 0.0 - 0.5 %    Neutrophils, Absolute 10.67 (H) 1.70 - 7.00 10*3/mm3    Lymphocytes, Absolute 2.51 0.70 - 3.10 10*3/mm3    Monocytes, Absolute 1.29 (H) 0.10 - 0.90 10*3/mm3    Eosinophils, Absolute 0.10 0.00 - 0.40 10*3/mm3    Basophils, Absolute 0.08 0.00 - 0.20 10*3/mm3    Immature Grans, Absolute 0.21 (H) 0.00 - 0.05 10*3/mm3    nRBC 0.1 0.0 - 0.2 /100 WBC   Lactic Acid, Plasma    Collection Time: 03/16/20  6:39 AM   Result Value Ref Range    Lactate 3.5 (C) 0.5 - 2.0 mmol/L       I ordered the above labs and reviewed the results.    RADIOLOGY  Ct Head Without Contrast    Result Date: 3/16/2020  EMERGENCY NONCONTRAST HEAD CT 03/16/2020  CLINICAL HISTORY: Severe headaches, fell off toilet, decreased alertness, unresponsiveness, rule out bleed.  TECHNIQUE: Spiral CT images were obtained from the base of the skull to the vertex without intravenous contrast. Images were reformatted and are submitted in 3 mm thick axial CT section with  brain algorithm, 2 mm thick sagittal and coronal reconstructions were performed and submitted in brain algorithm.  COMPARISON: There are no prior studies from [facility] for comparison.  FINDINGS: There is very extensive acute subarachnoid hemorrhage. It fills the visualized upper cervical spinal canal at the C1 level of the perimesencephalic cistern and interpeduncular cistern and suprasellar cistern sylvian fissures extends into the medial frontoparietal sulci. Furthermore, there is some intraventricular hemorrhage bodies of lateral ventricles, posterior trigones and occipital horns of lateral ventricles, as well as the third ventricle, aqueduct of Sylvius and fourth ventricle and filling the foramen of Luschka and Magendie, and there is some dilatation of the lateral and third ventricles compatible with some hydrocephalus. Some mild low-density in the periventricular white matter, may be small vessel disease or developing transependymal extension of CSF is also possible. No acute skull fracture seen in the paranasals. There is mild mucosal thickening in the right maxillary sinus. The remainder of the paranasal sinuses and mastoid air cells and middle ear cavities are clear.      1. There is very extensive acute subarachnoid hemorrhage filling the upper cervical spinal canal, the perimesencephalic and prepontine and interpeduncular cistern and suprasellar cistern, as well as the sylvian fissures, and extending into the frontoparietal sulci, hemorrhage in lateral ventricles partially opacifying the third ventricle, aqueduct Sylvius, fourth ventricle, foramina of Luschka and Magendie, and there is some dilatation lateral and third ventricles, some of which may be due to central volume loss, although I suspect there is developing hydrocephalus. Some early transependymal extension of CSF in the periventricular white matter is also suspected. This is highly suspicious for ruptured aneurysm. Cerebral  arteriogram is recommended to further evaluate, neurosurgical consultation is warranted. The results were communicated to Dr. Thom Almanza in the emergency room by telephone 03/16/2020 at 6:45 AM.  Radiation dose reduction techniques were utilized, including automated exposure control and exposure modulation based on body size.  This report was finalized on 3/16/2020 8:01 AM by Dr. Jus Underwood M.D.      Xr Chest 1 View    Result Date: 3/16/2020  AP CHEST  HISTORY: 86-year-old with altered mental status.  COMPARISON: None.  FINDINGS: Heart size is within normal limits. There are increased interstitial markings particularly within the mid to upper lungs and this appears symmetric and is most likely associated with COPD. There are no previous exams for comparison. A subtle superimposed infiltrate is difficult to exclude. There is no evidence of pulmonary edema or pleural effusion. Thoracic aorta is tortuous and aortic vascular calcifications are present. Cardiac monitoring leads are noted.      Increased interstitial markings, particularly in the mid to upper lungs. This appears symmetric and is most likely chronic, though there are no previous studies for comparison and it would be difficult to exclude a subtle superimposed interstitial infiltrate.  This report was finalized on 3/16/2020 7:40 AM by Dr. Raj Pool M.D.        I ordered the above noted radiological studies. I reviewed the images and results. I agree with the radiologist interpretation.    PROCEDURES  Procedures    MEDICATIONS GIVEN IN ER  Medications   sodium chloride 0.9 % flush 10 mL (has no administration in time range)   sodium chloride 0.9 % bolus 500 mL (500 mL Intravenous New Bag 3/16/20 0744)       PROGRESS, DATA ANALYSIS, CONSULTS, AND MEDICAL DECISION MAKING  A complete history and physical exam have been performed.  All available laboratory and imaging results have been reviewed by myself prior to disposition.  MDM    ED Course as of  Mar 16 0917   Mon Mar 16, 2020   0711 Discussed pertinent information from history and physical exam with patient.  Discussed differential diagnosis.  Discussed plan for ED evaluation/work-up/treatment.  All questions answered.  Patient is agreeable with plan.        [JG]   0711 Daughter at bedside.  Discussed CT imaging results with patient and family.  Daughter is healthcare surrogate, states patient has DNR/DNI although she does not have a copy with her.  Discussed at length and daughter confirms that patient would not want CPR or intubation.  Daughter states patient would not want aggressive care and would not want surgical care.  DNR ordered in epic.    [JG]   0712 Family states patient is not on anticoagulation or aspirin.    [JG]   0712 Acute intracerebral hemorrhage.  Consulted neurosurgery.  Started on nicardipine drip.    [JG]   0728 Phone call with Dr Brown.  Discussed the patient, relevant history, exam, diagnostics, ED findings/progress, and concerns. He agrees to consult, will have CLAUDIA to eval pt in ED prior to determining level of care. Recommend nicardipine titrated to SBP < 160.        [JG]   0733 EKG independently viewed and contemporaneously interpreted by ED physician. Time: 7:24 AM.  Rate 135.  Interpretation: Sinus tachycardia, left axis deviation, nonspecific intraventricular conduction delay, nonspecific ST changes, single PVC    [JG]   0734 Troponin elevated, likely secondary to ICH.  Patient has no chest pain or anginal equivalents, EKG nonspecific.  Not treating with aspirin or anticoagulation given ICH.    [JG]   0813 Patient current blood pressure under 160 off nicardipine drip.  Discussed with neurosurgery CLAUDIA, recommends admission to palliative care unit.  Nicardipine drip discontinued, consulting palliative care for admission.    [JG]   0831 Phone call with Dr Loera.  Discussed the patient, relevant history, exam, diagnostics, ED findings/progress, and concerns. They agree to  admit the patient to palliative care [address] [place] [address]. Care assumed by the admitting physician at this time.        [JG]      ED Course User Index  [JG] Zhen Gonzales MD       AS OF 09:17 VITALS:    BP - 155/100  HR - 101  TEMP - 98.5 °F (36.9 °C) (Tympanic)  O2 SATS - 100%    Critical care:  Total critical care time of 36 minutes is exclusive of any other billable procedures and includes time spent with direct patient care and observation, retrospective chart review, management of acute condition, and consultation with other physicians.      DIAGNOSIS  Final diagnoses:   SAH (subarachnoid hemorrhage) (CMS/HCC)   Hydrocephalus, unspecified type (CMS/HCC)   Leukocytosis, unspecified type   Chronic anemia   Elevated troponin   Elevated lactic acid level   Hyperglycemia         DISPOSITION  ADMISSION    Discussed treatment plan and reason for admission with pt/family and admitting physician.  Pt/family voiced understanding of the plan for admission for further testing/treatment as needed.          Zhen Gonzales MD  03/16/20 0918

## 2020-03-16 NOTE — ED TRIAGE NOTES
Pt to ER via [facility]. Original call for unresponsive. Per EMS pt was responsive on scene. Pt had fallen off toilet, lost control of bowels. Pt is normally oriented to self only. In triage pt not responive to RN or questions.

## 2020-03-16 NOTE — CONSULTS
Consults     Requested by Dr. Zhen Gonzales, ER physician    Patient Care Team:  Raffi Wooten MD as PCP - General (Emergency Medicine)    Chief complaint: found down in bathroom     Subjective     History of Present Illness     This is an 86-year-old male with a history of chronic COPD, emphysema, hypertension, and chronic kidney disease.  He was found down in the bathroom at approximately 4 AM by his girlfriend.  She stated that he had no complaints last evening other than feeling a little bit short of breath.  She did state that on Friday evening he had complained of some headache but seemed to be in his usual state of health.  He was brought to [facility] emergency room for further evaluation.  Patient is very drowsy but does answer questions with simple answers of yes or no.  CT scan of the head was performed in the ER. The results will be discussed in further detail below but did show an extensive acute subarachnoid hemorrhage. The patient was a bit hypertensive initially. He was placed on Cardene. His SBP is now in the 140's off Cardene.     Review of Systems   Unable to perform ROS: Acuity of condition        Past Medical History:   Diagnosis Date   • Chronic kidney disease    • COPD (chronic obstructive pulmonary disease) (CMS/HCC)    • Disease of thyroid gland    • Emphysema lung (CMS/HCC)    • GERD (gastroesophageal reflux disease)    • Hypertension    • Psoriasis    ,   Past Surgical History:   Procedure Laterality Date   • BONE MARROW BIOPSY     • COLONOSCOPY     • ENDOSCOPY     • HERNIA REPAIR     , History reviewed. No pertinent family history.,   Social History     Tobacco Use   • Smoking status: Former Smoker   • Smokeless tobacco: Never Used   Substance Use Topics   • Alcohol use: No     Frequency: Never   • Drug use: Not on file   ,   (Not in a hospital admission), Scheduled Meds:   , Continuous Infusions:   , PRN Meds:  •  [COMPLETED] Insert peripheral IV **AND** sodium chloride and  "Allergies:  Penicillins    Objective      Vital Signs  Temp:  [98.5 °F (36.9 °C)] 98.5 °F (36.9 °C)  Heart Rate:  [] 101  Resp:  [18] 18  BP: (150-176)/() 155/100    Physical Exam   Constitutional: He is oriented to person, place, and time. He appears well-developed and well-nourished. He is cooperative. No distress.   HENT:   Head: Normocephalic and atraumatic.   Right Ear: External ear normal.   Left Ear: External ear normal.   Nose: Nose normal.   Eyes: Pupils are equal, round, and reactive to light. Conjunctivae are normal. Right eye exhibits no discharge. Left eye exhibits no discharge.   Neck: Neck supple. No tracheal deviation present.   Cardiovascular: Normal rate and intact distal pulses.   Pulmonary/Chest: Effort normal. No respiratory distress.   Abdominal: Soft. He exhibits no distension. There is no tenderness.   Musculoskeletal: He exhibits no edema or tenderness.   Neurological: He is alert and oriented to person, place, and time. GCS eye subscore is 4. GCS verbal subscore is 5. GCS motor subscore is 6.   Awakens briefly to loud verbal stimuli. Pupils 4 mm and sluggishly reactive bilaterally. Patient able to state name. Confused to place and time. Answered \"no\" when asked if he had a headache. Answered \"no\" when asked if he would want us to perform any procedures to treat his problem. Answered \"yes\" when asked if he would like no measures to be taken to prolong his life and only use measures to keep him comfortable. Face symmetric. Tongue midline without fasiculations or atrophy.  The patient held up 2 fingers on the left hand and wiggle toes in the left foot on command.  Slow to hold up right thumb and wiggle right toes on command. The remainder of the neurologic exam was difficult to obtain due to patient's drowsiness.   Skin: Skin is warm and dry. He is not diaphoretic. No erythema.   Psychiatric: He has a normal mood and affect. Thought content normal.   Vitals reviewed.      Results " Review:    I reviewed the patient's new clinical results.  I reviewed the patient's new imaging results and agree with the interpretation.  Discussed with Dr. Elias     CT HEAD WITHOUT CONTRAST    Extensive acute subarachnoid hemorrhage filling the upper cervical spinal canal at the level of C1 and the perimesencephalic cistern as well as the interpeduncular cistern and suprasellar cistern.  The subarachnoid hemorrhage also extends from the sylvian fissures into the medial frontal parietal sulci.  There is additional extension of the hemorrhage into the intraventricular system involving the lateral ventricles, posterior trigones and occipital horns bilaterally.  There is also involvement of the third and fourth ventricles.  The lateral and third ventricles are dilated which is consistent with developing hydrocephalus.  No evidence of skull fracture.    CHEST X-RAY    Heart size is within normal limits. There are increased interstitial markings particularly within the mid to upper lungs and this appears symmetric and is most likely associated with COPD. There  are no previous exams for comparison. A subtle superimposed infiltrate is difficult to exclude. There is no evidence of pulmonary edema or pleural effusion. Thoracic aorta is tortuous and aortic vascular calcifications are present.    .  Results from last 7 days   Lab Units 03/16/20  0621 03/11/20  0923   WBC 10*3/mm3 14.86* 5.11   HEMOGLOBIN g/dL 12.1* 11.8*   HEMATOCRIT % 36.2* 36.7*   PLATELETS 10*3/mm3 228 184     .  Results from last 7 days   Lab Units 03/16/20  0621   SODIUM mmol/L 141   POTASSIUM mmol/L 3.9   CHLORIDE mmol/L 104   CO2 mmol/L 25.3   BUN mg/dL 15   CREATININE mg/dL 1.02   GLUCOSE mg/dL 220*   CALCIUM mg/dL 8.9           Assessment/Plan       SAH (subarachnoid hemorrhage) (CMS/HCC)    Massive SAH with intraventricular extension    Developing hydrocephalus    Suspected aneurysm rupture    Hx of chronic, severe COPD    Hx of HTN    Hx of  tobacco abuse      Plan    Spoke to family (daughter and son at bedside) at length about patient's current condition. Explained the treatment options for suspected aneurysm rupture including 4 vessel cerebral angiogram with subsequent coiling if aneurysm is found. I also explained extensive risks of post coiling care including and not limited to 1) prolonged intubation with risk for pneumonia and subsequent difficulty extubating given his age and COPD/emphysema 2) subsequent congestive heart failure due to need for fluid resuscitation as appropriate for prevention and/or management of vasospasm 3) risk of infection/sepsis given prolonged immobility. Given the patient's age, it would be very difficult for him to survive if any or all of these projected risks occur. The family is also aware that if he continues to bleed or develops worsening hydrocephalus, he will become much more drowsy and he will eventually .    The family including the daughter who is POA, are in agreement that no heroic measures be done including angiogram, resuscitation, intubation. They have elected comfort care only. The patient has been made a Do Not Resuscitate by Dr. Gonzales. Plans are noted for admission and palliative care. Dr. Elias has viewed the CT scan and I have discussed the history and exam findings with him. Dr. Elias agrees with the above stated plan.       I discussed the patients findings and my recommendations with patient, family and consulting provider    TONYA Yuen  20  09:03

## 2020-03-16 NOTE — SIGNIFICANT NOTE
03/16/20 1401   Rehab Time/Intention   Evaluation Not Performed other (see comments)  (Note consult for swallow eval, but patient is now Palliative. Will sign off, please reconsult as needed.)   Rehab Treatment   Discipline speech language pathologist

## 2020-03-16 NOTE — ED NOTES
General Surgery called per Dr. Gonzales at 7:00AM. Awaiting call back from Dr. Thomas.     Anahi Almanza  03/16/20 0704

## 2020-03-16 NOTE — ED NOTES
"Nursing report ED to floor  Justin Pederson  86 y.o.  male    HPI (triage note):   Chief Complaint   Patient presents with   • Fall   • Weakness - Generalized       Admitting doctor:   Edwardo Loera MD    Admitting diagnosis:   The primary encounter diagnosis was SAH (subarachnoid hemorrhage) (CMS/HCC). Diagnoses of Hydrocephalus, unspecified type (CMS/HCC), Leukocytosis, unspecified type, Chronic anemia, Elevated troponin, Elevated lactic acid level, and Hyperglycemia were also pertinent to this visit.    Code status:   Current Code Status     Date Active Code Status Order ID Comments User Context       3/16/2020 0708 No CPR [id number]  Zhen Gonzales MD ED       Questions for Current Code Status     Question Answer Comment    Code Status No CPR     Medical Interventions (Level of Support Prior to Arrest) Limited     Limited Support to NOT Include Intubation      Other      Cardioversion/Defibrillation     Limited Support to Also NOT Include surgery     Level Of Support Discussed With Health Care Surrogate           Allergies:   Penicillins    Weight:       03/16/20  0618   Weight: 57.7 kg (127 lb 4.8 oz)       Most recent vitals:   Vitals:    03/16/20 0637 03/16/20 0658 03/16/20 0710 03/16/20 0711   BP:  155/100     Pulse: 108 101     Resp:       Temp:       TempSrc:       SpO2: 99% 100% 100%    Weight:       Height:    165.1 cm (65\")       Active LDAs/IV Access:   Lines, Drains & Airways    Active LDAs     Name:   Placement date:   Placement time:   Site:   Days:    Peripheral IV 03/16/20 0615 Right Antecubital   03/16/20 0615    Antecubital   less than 1    Peripheral IV 03/16/20 0615 Left Antecubital   03/16/20 0615    Antecubital   less than 1                Labs (abnormal labs have a star):   Labs Reviewed   COMPREHENSIVE METABOLIC PANEL - Abnormal; Notable for the following components:       Result Value    Glucose 220 (*)     Total Protein 5.9 (*)     Albumin 3.40 (*)     All other components " within normal limits    Narrative:     GFR Normal >60  Chronic Kidney Disease <60  Kidney Failure <15     TROPONIN (IN-HOUSE) - Abnormal; Notable for the following components:    Troponin T 0.174 (*)     All other components within normal limits    Narrative:     Troponin T Reference Range:  <= 0.03 ng/mL-   Negative for AMI  >0.03 ng/mL-     Abnormal for myocardial necrosis.  Clinicians would have to utilize clinical acumen, EKG, Troponin and serial changes to determine if it is an Acute Myocardial Infarction or myocardial injury due to an underlying chronic condition.       Results may be falsely decreased if patient taking Biotin.     LACTIC ACID, PLASMA - Abnormal; Notable for the following components:    Lactate 3.5 (*)     All other components within normal limits   CBC WITH AUTO DIFFERENTIAL - Abnormal; Notable for the following components:    WBC 14.86 (*)     RBC 3.80 (*)     Hemoglobin 12.1 (*)     Hematocrit 36.2 (*)     Lymphocyte % 16.9 (*)     Immature Grans % 1.4 (*)     Neutrophils, Absolute 10.67 (*)     Monocytes, Absolute 1.29 (*)     Immature Grans, Absolute 0.21 (*)     All other components within normal limits   POCT GLUCOSE FINGERSTICK - Abnormal; Notable for the following components:    Glucose 199 (*)     All other components within normal limits   PROTIME-INR - Normal   APTT - Normal   LACTIC ACID REFLEX TIMER   TYPE AND SCREEN   CBC AND DIFFERENTIAL    Narrative:     The following orders were created for panel order CBC & Differential.  Procedure                               Abnormality         Status                     ---------                               -----------         ------                     CBC Auto Differential[[id number]]        Abnormal            Final result                 Please view results for these tests on the individual orders.       EKG:   ECG 12 Lead   Preliminary Result   HEART RATE= 135  bpm   RR Interval= 444  ms   ME Interval= 132  ms   P Horizontal Axis=  -30  deg   P Front Axis= 77  deg   QRSD Interval= 126  ms   QT Interval= 324  ms   QRS Axis= -12  deg   T Wave Axis= 95  deg   - ABNORMAL ECG -   Sinus tachycardia   Multiple premature complexes, vent & supraven   Nonspecific intraventricular conduction delay   Probable anteroseptal infarct, old   Repol abnrm suggests ischemia, lateral leads   Electronically Signed By:    Date and Time of Study: 2020-03-16 07:24:39          Meds given in ED:   Medications   sodium chloride 0.9 % flush 10 mL (has no administration in time range)   sodium chloride 0.9 % bolus 500 mL (500 mL Intravenous New Bag 3/16/20 0744)       Imaging results:  Ct Head Without Contrast    Result Date: 3/16/2020  1. There is very extensive acute subarachnoid hemorrhage filling the upper cervical spinal canal, the perimesencephalic and prepontine and interpeduncular cistern and suprasellar cistern, as well as the sylvian fissures, and extending into the frontoparietal sulci, hemorrhage in lateral ventricles partially opacifying the third ventricle, aqueduct Sylvius, fourth ventricle, foramina of Luschka and Magendie, and there is some dilatation lateral and third ventricles, some of which may be due to central volume loss, although I suspect there is developing hydrocephalus. Some early transependymal extension of CSF in the periventricular white matter is also suspected. This is highly suspicious for ruptured aneurysm. Cerebral arteriogram is recommended to further evaluate, neurosurgical consultation is warranted. The results were communicated to Dr. Thom Almanza in the emergency room by telephone 03/16/2020 at 6:45 AM.  Radiation dose reduction techniques were utilized, including automated exposure control and exposure modulation based on body size.  This report was finalized on 3/16/2020 8:01 AM by Dr. Jus Underwood M.D.      Xr Chest 1 View    Result Date: 3/16/2020  Increased interstitial markings, particularly in the mid to upper lungs. This  appears symmetric and is most likely chronic, though there are no previous studies for comparison and it would be difficult to exclude a subtle superimposed interstitial infiltrate.  This report was finalized on 3/16/2020 7:40 AM by Dr. Raj Pool M.D.        Ambulatory status:   - bedrest    Social issues:   Social History     Socioeconomic History   • Marital status:      Spouse name: Not on file   • Number of children: Not on file   • Years of education: Not on file   • Highest education level: Not on file   Tobacco Use   • Smoking status: Former Smoker   • Smokeless tobacco: Never Used   Substance and Sexual Activity   • Alcohol use: No     Frequency: Never        Rossi Ryan RN  03/16/20 0927

## 2020-03-16 NOTE — ED NOTES
Last known normal 20min ago. Pt c/o generalized weakness. Per EMS pt started complaining of severe headache in the back of the head. EMS reports a change of mental status before arrival      Vinny Torres, RN  03/16/20 0612

## 2020-03-16 NOTE — PROGRESS NOTES
Discharge Planning Assessment  [facility]     Patient Name: Justin Pederson  MRN: [id number]  Today's Date: 3/16/2020    Admit Date: 3/16/2020    Discharge Needs Assessment    No documentation.       Discharge Plan     Row Name 03/16/20 8727       Plan    Plan Comments  The patient was transferred to [facility] from ER on 3/16/2020. The patient is palliative. CCP will follow for any needs that may arise. COLLETTE Pérez RN, CCP.         Destination      Coordination has not been started for this encounter.      Durable Medical Equipment      Coordination has not been started for this encounter.      Dialysis/Infusion      Coordination has not been started for this encounter.      Home Medical Care      Coordination has not been started for this encounter.      Therapy      Coordination has not been started for this encounter.      Community Resources      Coordination has not been started for this encounter.          Demographic Summary    No documentation.       Functional Status    No documentation.       Psychosocial    No documentation.       Abuse/Neglect    No documentation.       Legal    No documentation.       Substance Abuse    No documentation.       Patient Forms    No documentation.           Julissa Pérez RN

## 2020-03-16 NOTE — PLAN OF CARE
Pt. VS still wnl.  Pt. Moans in pain at times, premedicated for turns.  Pt. Woke up restless and agitated this afternoon, ativan given.  Turning patient Q4h.  Skin WNL.  FC placed, cath care provided.  Pt. Resting comfortably at present, will continue to provide comfort and kindness.    Problem: Patient Care Overview  Goal: Plan of Care Review  Outcome: Ongoing (interventions implemented as appropriate)  Flowsheets (Taken 3/16/2020 5822)  Progress: declining  Plan of Care Reviewed With: patient; family

## 2020-03-17 NOTE — PLAN OF CARE
Problem: Patient Care Overview  Goal: Plan of Care Review  Flowsheets  Taken 3/16/2020 2200 by Angela Cody RN  Plan of Care Reviewed With: patient;family  Taken 3/17/2020 1701 by Marisol Crawford MS CCC-SLP  Outcome Summary: Clinical swallow evaluation completed. Recommend fork mashed diet with nectar thick liquids, may have ice chips and small sips of water between meals after oral care. Meds crushed in puree. Precautions: Upright, small bites and sips, no straw

## 2020-03-17 NOTE — THERAPY EVALUATION
Acute Care - Speech Language Pathology   Swallow Initial Evaluation [facility]     Patient Name: Justin Pederson  : 1933  MRN: [id number]  Today's Date: 3/17/2020               Admit Date: 3/16/2020    Visit Dx:     ICD-10-CM ICD-9-CM   1. SAH (subarachnoid hemorrhage) (CMS/HCC) I60.9 430   2. Hydrocephalus, unspecified type (CMS/HCC) G91.9 331.4   3. Leukocytosis, unspecified type D72.829 288.60   4. Chronic anemia D64.9 285.9   5. Elevated troponin R79.89 790.6   6. Elevated lactic acid level R79.89 276.2   7. Hyperglycemia R73.9 790.29     Patient Active Problem List   Diagnosis   • SAH (subarachnoid hemorrhage) (CMS/HCC)   • Acquired obstructive hydrocephalus (CMS/HCC)   • Ruptured aneurysm of intracranial region (CMS/HCC)   • COPD (chronic obstructive pulmonary disease) (CMS/HCC)   • Hypertension   • Chronic respiratory failure with hypoxia (CMS/HCC)   • Palliative care by specialist     Past Medical History:   Diagnosis Date   • Chronic kidney disease    • COPD (chronic obstructive pulmonary disease) (CMS/HCC)    • Disease of thyroid gland    • Emphysema lung (CMS/HCC)    • GERD (gastroesophageal reflux disease)    • Hypertension    • Psoriasis      Past Surgical History:   Procedure Laterality Date   • BONE MARROW BIOPSY     • COLONOSCOPY     • ENDOSCOPY     • HERNIA REPAIR          SWALLOW EVALUATION (last 72 hours)      SLP Adult Swallow Evaluation     Row Name 20 1600                   Rehab Evaluation    Document Type  evaluation  -MT        Subjective Information  no complaints  -MT        Patient Observations  alert;cooperative;agree to therapy  -MT        Patient Effort  good  -MT           General Information    Patient Profile Reviewed  yes  -MT        Pertinent History Of Current Problem  Brain bleed, went Palliative, woke up today, alert and hungry  -MT        Current Method of Nutrition  NPO  -MT        Precautions/Limitations, Vision  WFL;for purposes of eval  -MT         "Precautions/Limitations, Hearing  hearing impairment, bilaterally  -MT        Prior Level of Function-Communication  other (see comments) mildly slurred speech, premorbid per family  -MT        Prior Level of Function-Swallowing  no diet consistency restrictions;other (see comments) family states hx of swallowing problems/aspiration  -MT        Plans/Goals Discussed with  patient and family  -MT        Barriers to Rehab  medically complex  -MT        Patient's Goals for Discharge  patient did not state  -MT        Family Goals for Discharge  patient able to return to PO diet  -MT           Oral Motor and Function    Dentition Assessment  edentulous, dentures not available  -MT        Secretion Management  WNL/WFL  -MT        Mucosal Quality  dry  -MT           Oral Musculature and Cranial Nerve Assessment    Oral Motor General Assessment  generalized oral motor weakness  -MT           General Eating/Swallowing Observations    Respiratory Support Currently in Use  nasal cannula  -MT        Eating/Swallowing Skills  self-fed;fed by SLP  -MT        Positioning During Eating  upright in bed  -MT        Utensils Used  spoon;cup  -MT        Consistencies Trialed  soft textures;whole;pureed;thin liquids;nectar/syrup-thick liquids  -MT           Clinical Swallow Eval    Clinical Swallow Evaluation Summary  Patient demonstrated very loud swallow with thin, slight wetness after swallow. Audible swallow with nectar, puree and mech soft, but less than with thin. Prolonged mastication with mech soft, reduced ability to get to a safe swallowing consistency. Son reported he has a history of esophageal disorder and \"food going down his windpipe\"  -MT           Clinical Impression    SLP Swallowing Diagnosis  suspected pharyngeal dysfunction  -MT        Functional Impact  risk of aspiration/pneumonia  -MT        Rehab Potential/Prognosis, Swallowing  good, to achieve stated therapy goals  -MT        Swallow Criteria for Skilled " Therapeutic Interventions Met  demonstrates skilled criteria  -MT           Recommendations    Therapy Frequency (Swallow)  PRN  -MT        Predicted Duration Therapy Intervention (Days)  until discharge  -MT        SLP Diet Recommendation  puree with some mashed;nectar thick liquids;water between meals after oral care, with supervision  -MT        Recommended Diagnostics  VFSS (MBS)  -MT        Recommended Precautions and Strategies  upright posture during/after eating;small bites of food and sips of liquid;no straw  -MT        SLP Rec. for Method of Medication Administration  meds crushed;with pudding or applesauce  -MT        Monitor for Signs of Aspiration  yes;notify SLP if any concerns  -MT          User Key  (r) = Recorded By, (t) = Taken By, (c) = Cosigned By    Initials Name Effective Dates    MT Marisol Crawford, MS CCC-SLP 06/08/18 -           EDUCATION  The patient has been educated in the following areas:   Dysphagia (Swallowing Impairment).    SLP Recommendation and Plan  SLP Swallowing Diagnosis: suspected pharyngeal dysfunction  SLP Diet Recommendation: puree with some mashed, nectar thick liquids, water between meals after oral care, with supervision  Recommended Precautions and Strategies: upright posture during/after eating, small bites of food and sips of liquid, no straw  SLP Rec. for Method of Medication Administration: meds crushed, with pudding or applesauce     Monitor for Signs of Aspiration: yes, notify SLP if any concerns  Recommended Diagnostics: VFSS (MBS)  Swallow Criteria for Skilled Therapeutic Interventions Met: demonstrates skilled criteria     Rehab Potential/Prognosis, Swallowing: good, to achieve stated therapy goals  Therapy Frequency (Swallow): PRN  Predicted Duration Therapy Intervention (Days): until discharge       Outcome Summary: Clinical swallow evaluation completed. Recommend fork mashed diet with nectar thick liquids, may have ice chips and small sips of water between  meals after oral care. Meds crushed in puree. Precautions: Upright, small bites and sips, no straw         SLP Outcome Measures (last 72 hours)      SLP Outcome Measures     Row Name 03/17/20 1700             SLP Outcome Measures    Outcome Measure Used?  Adult NOMS  -MT         Adult FCM Scores    FCM Chosen  Swallowing  -MT      Swallowing FCM Score  3  -MT        User Key  (r) = Recorded By, (t) = Taken By, (c) = Cosigned By    Initials Name Effective Dates    Marisol Rivera MS CCC-SLP 06/08/18 -            Time Calculation:   Time Calculation- SLP     Row Name 03/17/20 1704             Time Calculation- SLP    SLP Start Time  1630  -MT      SLP Stop Time  1704  -MT      SLP Time Calculation (min)  34 min  -MT      SLP Received On  03/17/20  -MT        User Key  (r) = Recorded By, (t) = Taken By, (c) = Cosigned By    Initials Name Provider Type    Marisol Rivera MS CCC-SLP Speech and Language Pathologist          Therapy Charges for Today     Code Description Service Date Service Provider Modifiers Qty    [id number] HC ST EVAL ORAL PHARYNG SWALLOW 2 3/17/2020 Marisol Crawford MS CCC-SLP GN 1               Marisol Crawford MS CCC-SLP  3/17/2020

## 2020-03-17 NOTE — H&P
Palliative Care/Hospice Admit/Consult Note       Referring Provider: Zhen Gonzales MD  Reason for Consultation: Palliative/hospice care  Date of Admission:  3/16/2020    Patient Care Team:  Raffi Wooten MD as PCP - General (Emergency Medicine)  Edwardo Loera MD as Consulting Physician (Hospice and Palliative Medicine)    Chief complaint:  SAH    History of present illness:  The patient is a 86 y.o. male who presented to the ED 0709 3/16/2020 with complaints of a headache.  Per girlfriend at bedside, patient went to the bathroom at approximately 4 AM.  When he did not return, she went to the bathroom and found him laying on the floor.  Patient had lost control of bowels. Patient was complaining of severe headache and seemed confused.  Girlfriend stated patient was at baseline when he went to bed, no complaints.  However, the night before he was somewhat restless. Patient is not on a blood thinner or aspirin.    CT head 3/16/2020 at 6:30 AM:  IMPRESSION:  1. There is very extensive acute subarachnoid hemorrhage filling the upper cervical spinal canal, the perimesencephalic and prepontine and interpeduncular cistern and suprasellar cistern, as well as the sylvian  fissures, and extending into the frontoparietal sulci, hemorrhage in lateral ventricles partially opacifying the third ventricle, aqueduct Sylvius, fourth ventricle, foramina of Luschka and Magendie, and there is some dilatation lateral and third ventricles, some of which may be due to central volume loss, although I suspect there is developing hydrocephalus. Some early transependymal extension of CSF in the periventricular white matter is also suspected. This is highly suspicious for ruptured aneurysm.    Neurosurgery reviewed the patient and films.  I discussed with the patient's son and daughter who is POA.  They were in agreement that no heroic measures be done.  They elected for comfort care only and I was called.    At the time of my  evaluation, both were at bedside along with other family members.  The patient was not responsive.  No ROS obtainable.    Review of Systems  Pertinent items are noted in HPI    Palliative Performance Scale  Palliative Performance Scale Score: 10%  [place] Symptom Assessment System Revised  Pain Score: no pain   ESAS Tiredness Score: Worst possible tiredness  ESAS Nausea Score: No nausea  ESAS Depression Score: unable to assess  ESAS Anxiety Score: 2  ESAS Drowsiness Score: Worst possible drowsiness  ESAS Lack of Appetite Score: Worst lack of appetite  ESAS Wellbeing Score: unable to assess  ESAS Dyspnea Score: No shortness of breath  ESAS Other Problem Score: Best possible response  ESAS Source of Information: healthcare professional caregiver  ESAS Intervention: medicated/see MAR  ESAS Intervention Response: tolerated    History  Past Medical History:   Diagnosis Date   • Chronic kidney disease    • COPD (chronic obstructive pulmonary disease) (CMS/HCC)    • Disease of thyroid gland    • Emphysema lung (CMS/HCC)    • GERD (gastroesophageal reflux disease)    • Hypertension    • Psoriasis    ,   Past Surgical History:   Procedure Laterality Date   • BONE MARROW BIOPSY     • COLONOSCOPY     • ENDOSCOPY     • HERNIA REPAIR     , History reviewed. No pertinent family history. and   Social History     Tobacco Use   • Smoking status: Former Smoker   • Smokeless tobacco: Never Used   Substance Use Topics   • Alcohol use: No     Frequency: Never   • Drug use: Not on file       Vital Signs   Temp:  [97.4 °F (36.3 °C)-98.5 °F (36.9 °C)] 98.4 °F (36.9 °C)  Heart Rate:  [] 78  Resp:  [10-18] 12  BP: (115-176)/() 115/71  Device (Oxygen Therapy): nasal cannulaFlow (L/min):  [2-3] 2SpO2:  [89 %-100 %] 94 %    Physical Exam:  General Appearance:   Not awake and appears in no acute distress   Head:    Normocephalic, without obvious abnormality, atraumatic   Eyes:            Lids and lashes normal, conjunctivae and  sclerae normal, no   icterus   Ears:    Ears appear intact with no abnormalities noted   Throat:   No oral lesions, oral mucosa moist   Neck:   No adenopathy, supple, trachea midline, no thyromegaly   Back:     No scoliosis present   Lungs:     Clear to auscultation, respirations diminished and regular and not labored    Heart:    Regular rhythm and normal rate   Breast Exam:    Deferred   Abdomen:   Occasional bowel sounds, soft and non-tender, non-distended   Genitalia:    Deferred   Extremities:  No edema, no cyanosis    Pulses:  Radial pulses palpable and equal bilaterally   Skin:   No bleeding         Neurologic:  Not awake to test; per neurosurgery     Results Review:   I reviewed the patient's new clinical results.      Impression:      SAH (subarachnoid hemorrhage) (CMS/HCC)    Palliative care by specialist    Acquired obstructive hydrocephalus (CMS/HCC)    Ruptured aneurysm of intracranial region (CMS/HCC)    COPD (chronic obstructive pulmonary disease) (CMS/HCC)    Hypertension    Chronic respiratory failure with hypoxia (CMS/HCC)        Plan:  I reviewed earlier with the emergency room physician by phone.  I reviewed the patient's chart.  I reviewed with the RN.  I examined the patient and subsequently reviewed with the patient's daughter, POA, and son.  They both stated that the patient was more comfortable presently.  They understand the patient's terminal condition.  I explained to them that medications will be given to treat symptoms to maintain comfort.  No attempts at resuscitation will be made.  I answered all of their questions.    Edwardo Loera MD  Hospice and Palliative Medicine  03/16/20  21:10

## 2020-03-17 NOTE — PLAN OF CARE
Problem: Patient Care Overview  Goal: Plan of Care Review  Outcome: Ongoing (interventions implemented as appropriate)  Flowsheets  Taken 3/17/2020 0224  Progress: no change  Outcome Summary: Pt became alert at 0200 stating he had a headache and his right hip hurt, Morphine 4mg given x1. Granddaughter at bedside hopeful. Will continue to keep comfortable

## 2020-03-17 NOTE — CONSULTS
Met with pt, daughter and granddaughter, who are ecstatic that pt has become responsive and oriented and asking and answering questions appropriately, in contrast to yesterday when he was mostly sleeping and unresponsive.    Will continue to offer pastoral care and assessment.  nikko Lincoln

## 2020-03-17 NOTE — PLAN OF CARE
Problem: Patient Care Overview  Goal: Plan of Care Review  Outcome: Ongoing (interventions implemented as appropriate)  Flowsheets  Taken 3/17/2020 1844  Progress: improving  Taken 3/17/2020 0905  Plan of Care Reviewed With: patient;family   Patient woke up today and oriented to self and place, forgetful at times. Family at bedside and very happy that patient is awake and able to talk. Speech ordered and they were able to come evaluate patient and make recommendations. Turn Q4. Bentley in place for comfort. Encouraged patient to enjoy the awake time not knowing how long it will last and to take one day at a time. Chocolate boost ordered and in back fridge for patient upon request. O2 left on patient for comfort, patient has been on home O2 for the past 3 years per family report. Will continue to monitor patient and treat according to MD orders.

## 2020-03-18 PROBLEM — R13.12 OROPHARYNGEAL DYSPHAGIA: Status: ACTIVE | Noted: 2020-01-01

## 2020-03-18 NOTE — PROGRESS NOTES
"CC:  Family asked us to come back today to discuss diagnosis and prognosis.       Blood pressure 101/67, pulse 86, temperature 97.8 °F (36.6 °C), temperature source Oral, resp. rate 18, height 165.1 cm (65\"), weight 54.4 kg (120 lb), SpO2 98 %.      Pt sleeping in chair when I arrived.  Family asked that I let him rest. On 2L O2, no obvious resp distress.        ..  Results from last 7 days   Lab Units 03/16/20  0621   WBC 10*3/mm3 14.86*   HEMOGLOBIN g/dL 12.1*   HEMATOCRIT % 36.2*   PLATELETS 10*3/mm3 228       ..  Results from last 7 days   Lab Units 03/16/20  0621   SODIUM mmol/L 141   POTASSIUM mmol/L 3.9   CHLORIDE mmol/L 104   CO2 mmol/L 25.3   BUN mg/dL 15   CREATININE mg/dL 1.02   GLUCOSE mg/dL 220*   CALCIUM mg/dL 8.9         S/p SAH, likely aneurysmal      I had a lengthy conversation with the family regarding his prognosis.  They understand that even in the best of circumstances with young healthy patients this diagnosis carries a very high risk (50-75%) of mortality or severe morbidity.  They understand that even with aggressive intervention he would likely have significant impairments.  The patient had been very clear that he did not want aggressive intervention or life-saving measures that require intubation at any point.  He will likely deteriorate further due to multiple potential issues including aspiration, other sources of infection or medical issues and vasospasm.  Dr. Elias arrived during our conversation as well and reinforced all of this.  The family was very grateful for the clarification and unequivocably stated that they are not interested in any intervention and would continue with hospice/palliative measures.  Please call with any additional questions or concerns.  We will otherwise sign off.  "

## 2020-03-18 NOTE — PLAN OF CARE
Problem: Patient Care Overview  Goal: Plan of Care Review  Outcome: Ongoing (interventions implemented as appropriate)  Flowsheets (Taken 3/18/2020 0044)  Progress: no change  Plan of Care Reviewed With: patient; family  Note:   PRN meds admin for sx management and c/o pain, sx and c/o improve after interventions. Pt appears to have rested/slept comfortably between care. Pt remains A&O to person & place but remains confused r/t situation and circumstances behind admission. Pt reoriented multiple times r/t situation/circumstances, but does not seem able to recall, and continues to make confused statements. Family at bsd through shift, supportive and attentive. Continue to educate, reorient, monitor and tx per POC and MD orders.

## 2020-03-18 NOTE — CONSULTS
"Met with family to explain hospice services. Focused conversation on services at home vs facility. Patient has received a few doses of morphine but does not meet for HSB at this time. Will re-evaluate tomorrow to ensure patient does not need inpatient admission and that this was not a \"rally\" day. Family discussing next steps, home vs facility. Thank you for the referral and for allowing me to participate in the care of this patient.    Janine Gray RN  [facility]  [phone]  "

## 2020-03-18 NOTE — PROGRESS NOTES
Palliative Care/Hospice Follow Up Note       LOS: 2 days   Patient Care Team:  Raffi Wooten MD as PCP - General (Emergency Medicine)  dEwardo Loera MD as Consulting Physician (Hospice and Palliative Medicine)    Chief Complaint:  SAH    Interval History:     Patient Complaints: Left frontal headache   Patient Denies:  SOA or CP  History taken from:  Patient and family and RN    Review of Systems:  As above.    Palliative Performance Scale  Palliative Performance Scale Score: 30%  [place] Symptom Assessment System Revised  Pain Score: 6(head and neck)   ESAS Tiredness Score: 4  ESAS Nausea Score: No nausea  ESAS Depression Score: unable to assess  ESAS Anxiety Score: 2  ESAS Drowsiness Score: 4  ESAS Lack of Appetite Score: 6  ESAS Wellbeing Score: unable to assess  ESAS Dyspnea Score: No shortness of breath  ESAS Other Problem Score: Best possible response  ESAS Source of Information: healthcare professional caregiver  ESAS Intervention: medicated/see MAR  ESAS Intervention Response: tolerated    Vital Signs  Temp:  [97.8 °F (36.6 °C)-97.9 °F (36.6 °C)] 97.9 °F (36.6 °C)  Heart Rate:  [78-86] 78  Resp:  [18-20] 20  BP: (101-131)/(67-73) 131/73  Device (Oxygen Therapy): nasal cannulaFlow (L/min):  [2] 2SpO2:  [98 %-100 %] 100 %    Physical Exam:  General Appearance:    Awake and appears in no acute distress, knows he is in the hospital, sitting up in a chair eating, no cough, but tires easily    Throat:   No oral lesions, oral mucosa moist   Neck:   No adenopathy, supple, trachea midline   Lungs:     Clear to auscultation with occasional crackle, respirations regular and not labored    Heart:    Regular rhythm and normal rate   Abdomen:     Occasional bowel sounds, soft and non-tender, non-distended   Extremities:   No edema, no cyanosis   Pulses:   Radial pulses palpable and equal bilaterally          Results Review:     I reviewed the patient's new clinical results.    Medication  Reviewed.    Assessment/Plan       SAH (subarachnoid hemorrhage) (CMS/[facility])    Palliative care by specialist    Acquired obstructive hydrocephalus (CMS/[facility])    Ruptured aneurysm of intracranial region (CMS/HCC)    Oropharyngeal dysphagia    COPD (chronic obstructive pulmonary disease) (CMS/[facility])    Hypertension    Chronic respiratory failure with hypoxia (CMS/[facility])    At the time of my examination, the patient is awake and aware and eating his evening meal.  Family at bedside and I reviewed all with them.  He complains of a left frontal headache, mild.  The patient received 2 doses of 2 mg morphine thus far today.    I asked neurology and neurosurgery to review the patient again since he is now awake. Their input is appreciated and appropriate. I discussed the case with Dr Barrett. I still cautioned the family about how good he may get.  I described to them that he has very little reserve.  I told them that this response may be short-lived.    Speech therapy evaluated the patient for swallow precautions, oropharyngeal dysphagia noted.      At this time, based on neurology and neurosurgery reevaluation and the patient's present condition and condition prior to admission, possibilities include home with hospice with 24/7 care.  Some p.o. medicines restarted.  We will try to get Bentley catheter out tomorrow.  Will consider home Friday if he remains as is?      Plan for disposition:  Pending    Edwardo Loera MD  Hospice and Palliative Medicine  03/18/20  20:37

## 2020-03-18 NOTE — PROGRESS NOTES
Palliative Care Sw followed up with patient, patient's son, and significant other at bedside. Patient sitting up in bed, trying to converse but unable to understand. Family reported that they are waiting to speak with the physician regarding plan. Provided psychosocial support and advised family of availability.

## 2020-03-18 NOTE — CONSULTS
"Neurology Consult Note    Consult Date: 3/18/2020    Referring MD: Edwardo Loera MD    Reason for Consult I have been asked to see the patient in neurological consultation to render advice and opinion regarding subarachnoid hemorrhage, patient miriam Pederson is a 86 y.o. male who was originally admitted for subarachnoid hemorrhage.  He was found down at home with confusion and severe headache.  CT on arrival here showed diffuse subarachnoid hemorrhage.  This was felt to be aneurysmal.  Neurosurgery saw the patient and family reported that patient did not want any aggressive care.  He was transitioned to the palliative care unit based on his goals however over the past several days he has had a remarkable recovery.  At this point he is sitting up in bed and conversing and seems to be mostly back to his neurologic baseline.  I was consulted regarding prognosis and next steps.    Past Medical History:   Diagnosis Date   • Chronic kidney disease    • COPD (chronic obstructive pulmonary disease) (CMS/HCC)    • Disease of thyroid gland    • Emphysema lung (CMS/HCC)    • GERD (gastroesophageal reflux disease)    • Hypertension    • Psoriasis        ROS:  No fevers, chills  No weakness, numbness, + headache    Exam    /67 (BP Location: Right arm, Patient Position: Lying)   Pulse 86   Temp 97.8 °F (36.6 °C) (Oral)   Resp 18   Ht 165.1 cm (65\")   Wt 54.4 kg (120 lb)   SpO2 98%   BMI 19.97 kg/m²   Gen: NAD, vitals reviewed  MS: oriented x2, recent/remote memory impaired, normal attention/concentration, language intact, no neglect.  CN: visual acuity grossly normal, PERRL, EOMI, no facial droop, no dysarthria  Motor: 5/5 throughout upper and lower extremities, normal tone    DATA:    Lab Results   Component Value Date    GLUCOSE 220 (H) 03/16/2020    CALCIUM 8.9 03/16/2020     03/16/2020    K 3.9 03/16/2020    CO2 25.3 03/16/2020     03/16/2020    BUN 15 03/16/2020    CREATININE 1.02 " 03/16/2020    EGFRIFNONA 69 03/16/2020    BCR 14.7 03/16/2020    ANIONGAP 11.7 03/16/2020     Lab Results   Component Value Date    WBC 14.86 (H) 03/16/2020    HGB 12.1 (L) 03/16/2020    HCT 36.2 (L) 03/16/2020    MCV 95.3 03/16/2020     03/16/2020       Lab review: WBC 15, hemoglobin 12.1    Imaging review: I personally reviewed his CT head from admission which shows diffuse subarachnoid hemorrhage suspicious for aneurysmal etiology.  Radiology report reviewed    Diagnoses:  Subarachnoid hemorrhage, aneurysmal  Encephalopathy, improving    Comment: 86-year-old with spontaneous subarachnoid hemorrhage, high-grade on initial scans who has made a very surprising recovery.  Initial plans were for palliative care.  I was called regarding neck steps given that he has recovered much more than expected. Will discuss with Dr. Elias    PLAN:  Will discuss patient with neurosurgery    Addendum: discussed with Dr. Elias and Dr. Loera. Pursuing additional workup and treatment would likely involve additional procedures including ventriculostomy, angiogram, definitely an ICU stay with intubation if he deteriorated. I discussed these things with the patient and his POA; they were adamant that he absolutely would not want any of these interventions. I told them the next step would be a hospice referral given his high likelihood of vasospasm or rebleeding and they agreed. Discussed with Dr. Loera about hospice referral and he agrees. I asked Dr. Elias to discuss with family later today.

## 2020-03-18 NOTE — PROGRESS NOTES
Discharge Planning Assessment  [facility]     Patient Name: Justin Pederson  MRN: [id number]  Today's Date: 3/18/2020    Admit Date: 3/16/2020    Discharge Needs Assessment     Row Name 03/18/20 1743       Living Environment    Lives With  significant other    Name(s) of Who Lives With Patient  Sydnie Brown/Girlienasa    Unique Family Situation  They both own their own home and stay a couple days at each one's home weekly    Current Living Arrangements  home/apartment/condo    Primary Care Provided by  self    Provides Primary Care For  significant other    Quality of Family Relationships  helpful;involved;supportive    Able to Return to Prior Arrangements  -- The family is discussing all their options at this time    Living Arrangement Comments  lives with his Girlfriend       Resource/Environmental Concerns    Resource/Environmental Concerns  none    Transportation Concerns  car, none       Transition Planning    Patient/Family Anticipates Transition to  home with family and with [facility] services    Patient/Family Anticipated Services at Transition  ;hospice care;skilled nursing    Transportation Anticipated  family or friend will provide;health plan transportation       Discharge Needs Assessment    Readmission Within the Last 30 Days  no previous admission in last 30 days    Concerns to be Addressed  decision making;basic needs;discharge planning;home safety;adjustment to diagnosis/illness    Equipment Currently Used at Home  none    Anticipated Changes Related to Illness  inability to care for self    Equipment Needed After Discharge  -- will follow for any DME order    Outpatient/Agency/Support Group Needs  home hospice    Provided Post Acute Provider List?  Yes    Post Acute Provider List  Hospice;Nursing Home;Long Term Acute Care;Home Health    Delivered To  Support Person    Support Person  Sydnie Kevni/ariela, Raffi Pederson/son, and Usha San/daughter    Method of Delivery  In person     Patient's Choice of Community Agency(s)  CCP provided HH/SNF list and printout of ratings from medicare.gov    Current Discharge Risk  lack of support system/caregiver;dependent with mobility/activities of daily living        Discharge Plan     Row Name 03/18/20 4236       Plan    Plan  unsure at this time. Palliative    Provided Post Acute Provider Quality & Resource List?  Yes    Post Acute Provider Quality and Resource List  Home Health;Nursing Home;Long Term Acute Care    Delivered To  Support Person    Support Person  Sydnie Kevin/yogeshara, Raffi Pederson/son, and Usha San/daughter    Method of Delivery  In person    Patient/Family in Agreement with Plan  yes    Plan Comments  Spoke with Sydnie Brown/yogeshliza, Raffi Pederson/son, and Usha San/daughter and went over all discharge options at this time. The family states they would like to talk to the MD's before making a decision. Janine/[facility] met with the family and did an Explanation of services today. CCP will follow back up with the family to verify discharge plans. COLLETTE Pérez RN, CCP        Destination      Coordination has not been started for this encounter.      Durable Medical Equipment      Coordination has not been started for this encounter.      Dialysis/Infusion      Coordination has not been started for this encounter.      Home Medical Care      Coordination has not been started for this encounter.      Therapy      Coordination has not been started for this encounter.      Community Resources      Coordination has not been started for this encounter.          Demographic Summary     Row Name 03/18/20 5407       General Information    Admission Type  inpatient    Arrived From  emergency department    Referral Source  admission list;physician;nursing;family    Reason for Consult  decision making;discharge planning;end of life/hospice;palliative care    Preferred Language  English     Used During This Interaction  no        Contact Information    Permission Granted to Share Info With  ;third party payee;other (see comments) \[facility]\""    Contact Information Obtained for          Functional Status     Row Name 03/18/20 8926       Functional Status    Usual Activity Tolerance  moderate    Current Activity Tolerance  poor       Functional Status, IADL    Medications  independent    Meal Preparation  independent    Housekeeping  independent    Laundry  independent    Shopping  independent    IADL Comments  was IADL'S prior to admission       Mental Status    General Appearance WDL  WDL       Employment/    Employment Status  retired        Psychosocial    No documentation.       Abuse/Neglect    No documentation.       Legal    No documentation.       Substance Abuse    No documentation.       Patient Forms    No documentation.           Julissa Pérez RN

## 2020-03-18 NOTE — THERAPY RE-EVALUATION
Acute Care - Speech Language Pathology   Swallow Re-Evaluation [facility]     Patient Name: Justin Pederson  : 1933  MRN: [id number]  Today's Date: 3/18/2020               Admit Date: 3/16/2020    Visit Dx:     ICD-10-CM ICD-9-CM   1. SAH (subarachnoid hemorrhage) (CMS/HCC) I60.9 430   2. Hydrocephalus, unspecified type (CMS/HCC) G91.9 331.4   3. Leukocytosis, unspecified type D72.829 288.60   4. Chronic anemia D64.9 285.9   5. Elevated troponin R79.89 790.6   6. Elevated lactic acid level R79.89 276.2   7. Hyperglycemia R73.9 790.29     Patient Active Problem List   Diagnosis   • SAH (subarachnoid hemorrhage) (CMS/HCC)   • Acquired obstructive hydrocephalus (CMS/HCC)   • Ruptured aneurysm of intracranial region (CMS/HCC)   • COPD (chronic obstructive pulmonary disease) (CMS/HCC)   • Hypertension   • Chronic respiratory failure with hypoxia (CMS/HCC)   • Palliative care by specialist     Past Medical History:   Diagnosis Date   • Chronic kidney disease    • COPD (chronic obstructive pulmonary disease) (CMS/HCC)    • Disease of thyroid gland    • Emphysema lung (CMS/HCC)    • GERD (gastroesophageal reflux disease)    • Hypertension    • Psoriasis      Past Surgical History:   Procedure Laterality Date   • BONE MARROW BIOPSY     • COLONOSCOPY     • ENDOSCOPY     • HERNIA REPAIR          SWALLOW EVALUATION (last 72 hours)      SLP Adult Swallow Evaluation     Row Name 20 1400          Document Type  re-evaluation  -    Subjective Information  no complaints  -SH    Patient Observations  alert;cooperative  -    Patient Effort  adequate  -          Patient Profile Reviewed  yes  -SH    Pertinent History Of Current Problem  SAH, palliative, plans for hospice, patient and family want least retrictive diet and aware of aspiration risks  -    Current Method of Nutrition  nectar/syrup-thick liquids;pureed with some mashed  -    Precautions/Limitations, Vision  WFL;for purposes of eval  -     Precautions/Limitations, Hearing  hearing impairment, bilaterally  -    Prior Level of Function-Communication  other (see comments) confused this date  -    Prior Level of Function-Swallowing  no diet consistency restrictions  -    Plans/Goals Discussed with  patient and family  -    Barriers to Rehab  medically complex  -    Patient's Goals for Discharge  patient did not state  -    Family Goals for Discharge  patient able to return to PO diet  -          Dentition Assessment  upper dentures/partial in place;lower dentures/partial in place  -    Secretion Management  WNL/WFL  -    Mucosal Quality  dry  -          Oral Motor General Assessment  generalized oral motor weakness  -          Respiratory Support Currently in Use  nasal cannula  -    Eating/Swallowing Skills  self-fed;fed by SLP  -    Positioning During Eating  upright in bed  -    Utensils Used  spoon;cup  -    Consistencies Trialed  soft textures;whole;pureed;thin liquids;nectar/syrup-thick liquids  -          Clinical Swallow Evaluation Summary  Patient seen for re evaluation of swallow function. Wet, weak voice upon SLP's arrival. Pt cleared with cues. Laryngeal elevation reduced and effortful. No overt s/s of aspiration with ice. Immediate cough with thins via cup. Pt consistently required rest breaks to breath between trials. No overt s/s of asp with nectar via spoon/cup or puree, multiple swallows appreciated. Residue suspected. Rotary and munching mastication pattern with mech soft. No oral residue post swallow. Occasional voice change noted across trials. SLP suspected patient with trace aspiration at times even with modified diet. Family present voiced understanding of aspiration risks. Will trial upgrade to mech soft, continue nectar. Small/bites and sips. Other family present meeting with hospice currently.   -          SLP Swallowing Diagnosis  suspected pharyngeal dysfunction  -    Functional Impact  risk  of aspiration/pneumonia  -    Rehab Potential/Prognosis, Swallowing  good, to achieve stated therapy goals  -    Swallow Criteria for Skilled Therapeutic Interventions Met  demonstrates skilled criteria  -          Therapy Frequency (Swallow)  PRN  -    Predicted Duration Therapy Intervention (Days)  until discharge  -    SLP Diet Recommendation  NPO;other (see comments) nectar/mech soft for QofL  -    Recommended Diagnostics  VFSS (MBS)  -    Recommended Precautions and Strategies  upright posture during/after eating;small bites of food and sips of liquid;no straw  -    SLP Rec. for Method of Medication Administration  meds crushed;with pudding or applesauce  -    Monitor for Signs of Aspiration  yes;notify SLP if any concerns  -          Oral Nutrition/Hydration Goal Selection (SLP)  --  -      User Key  (r) = Recorded By, (t) = Taken By, (c) = Cosigned By    Initials Name Effective Dates    Marisol Rivera, MS CCC-SLP 06/08/18 -      Abram Armstrongah GELY MS CCC-SLP 03/07/18 -           EDUCATION  The patient has been educated in the following areas:   Dysphagia (Swallowing Impairment).    SLP Recommendation and Plan  SLP Swallowing Diagnosis: suspected pharyngeal dysfunction  SLP Diet Recommendation: NPO, other (see comments)(nectar/mech soft for QofL)  Recommended Precautions and Strategies: upright posture during/after eating, small bites of food and sips of liquid, no straw  SLP Rec. for Method of Medication Administration: meds crushed, with pudding or applesauce     Monitor for Signs of Aspiration: yes, notify SLP if any concerns  Recommended Diagnostics: VFSS (MBS)  Swallow Criteria for Skilled Therapeutic Interventions Met: demonstrates skilled criteria     Rehab Potential/Prognosis, Swallowing: good, to achieve stated therapy goals  Therapy Frequency (Swallow): PRN  Predicted Duration Therapy Intervention (Days): until discharge                 SLP Outcome Measures (last 72 hours)       SLP Outcome Measures     Row Name 03/18/20 1500 03/17/20 1700          SLP Outcome Measures    Outcome Measure Used?  Adult NOMS  -  Adult NOMS  -MT        Adult FCM Scores    FCM Chosen  Swallowing  -  Swallowing  -MT     Swallowing FCM Score  2  -  3  -MT       User Key  (r) = Recorded By, (t) = Taken By, (c) = Cosigned By    Initials Name Effective Dates    MT Marisol Crawford, MS CCC-SLP 06/08/18 -      Shantell Armstrong MS CCC-SLP 03/07/18 -            Time Calculation:   Time Calculation- SLP     Row Name 03/18/20 1509             Time Calculation- SLP    SLP Start Time  1400  -SH      SLP Received On  03/18/20  -        User Key  (r) = Recorded By, (t) = Taken By, (c) = Cosigned By    Initials Name Provider Type     Shantell Armstrong MS CCC-SLP Speech and Language Pathologist          Therapy Charges for Today     Code Description Service Date Service Provider Modifiers Qty    [id number]  ST TREATMENT SWALLOW 4 3/18/2020 Shantell Armstrong MS CCC-SLP GN 1               Shantell Armstrong MS CCC-SLP  3/18/2020

## 2020-03-18 NOTE — PROGRESS NOTES
Palliative Care/Hospice Follow Up Note       LOS: 1 day   Patient Care Team:  Raffi Wooten MD as PCP - General (Emergency Medicine)  Edwardo Loera MD as Consulting Physician (Hospice and Palliative Medicine)    Chief Complaint:  SAH    Interval History:     Patient Complaints: Headache and neck pain  Patient Denies:  SOA or CP  History taken from:  Patient and family and RN    Review of Systems:  As above.    Palliative Performance Scale  Palliative Performance Scale Score: 30%  [place] Symptom Assessment System Revised  Pain Score: no pain   ESAS Tiredness Score: 4  ESAS Nausea Score: No nausea  ESAS Depression Score: No depression  ESAS Anxiety Score: 2  ESAS Drowsiness Score: 6  ESAS Lack of Appetite Score: 8  ESAS Wellbeing Score: Best wellbeing  ESAS Dyspnea Score: No shortness of breath  ESAS Other Problem Score: Best possible response  ESAS Source of Information: healthcare professional caregiver  ESAS Intervention: medicated/see MAR  ESAS Intervention Response: tolerated    Vital Signs  Temp:  [97.6 °F (36.4 °C)-98 °F (36.7 °C)] 98 °F (36.7 °C)  Heart Rate:  [89] 89  Resp:  [12-18] 18  BP: ()/(55-69) 112/69  Device (Oxygen Therapy): nasal cannulaFlow (L/min):  [2] 2SpO2:  [99 %-100 %] 100 %    Physical Exam:  General Appearance:    Awake and appears in no acute distress, knows he is in the hospital   Throat:   No oral lesions, oral mucosa moist   Neck:   No adenopathy, supple, trachea midline   Lungs:     Clear to auscultation with occasional crackle, respirations regular and not labored    Heart:    Regular rhythm and normal rate   Abdomen:     Occasional bowel sounds, soft and non-tender, non-distended   Extremities:   No edema, no cyanosis   Pulses:   Radial pulses palpable and equal bilaterally          Results Review:     I reviewed the patient's new clinical results.    Medication Reviewed.    Assessment/Plan       SAH (subarachnoid hemorrhage) (CMS/[facility])    Palliative care by  specialist    Acquired obstructive hydrocephalus (CMS/HCC)    Ruptured aneurysm of intracranial region (CMS/HCC)    COPD (chronic obstructive pulmonary disease) (CMS/HCC)    Hypertension    Chronic respiratory failure with hypoxia (CMS/HCC)    At the time of my examination, the patient is awake and aware.  Family at bedside and I reviewed all with them.  The patient is taken some liquid p.o. with the straw and coughs occasionally.  He complains of a headache and neck pain.  The patient received 1 dose of 4 mg morphine very early this a.m.    I reviewed with the family outside the room.  Sedation and relative dehydration probably has helped his course.  I still cautioned the family about how good he may get.  I described to them that he has very little reserve.  I told them that this response may be short-lived.    Speech therapy will evaluate the patient for swallow precautions.  Additionally, consider having neurology evaluate the patient tomorrow for any therapies that may allow him to try to get better.      Plan for disposition:  Pending    Edwardo Loera MD  Hospice and Palliative Medicine  03/17/20  21:25

## 2020-03-19 NOTE — PROGRESS NOTES
Palliative Care/Hospice Follow Up Note       LOS: 3 days   Patient Care Team:  Raffi Wooten MD as PCP - General (Emergency Medicine)  Edwardo Loera MD as Consulting Physician (Hospice and Palliative Medicine)    Chief Complaint:  SAH    Interval History:     Patient Complaints: Hheadache   Patient Denies:  SOA or CP  History taken from:  Daughterand patient and RN    Review of Systems:  As above.    Palliative Performance Scale  Palliative Performance Scale Score: 30%  [place] Symptom Assessment System Revised  Pain Score: no pain   ESAS Tiredness Score: 5  ESAS Nausea Score: No nausea  ESAS Depression Score: No depression  ESAS Anxiety Score: 3  ESAS Drowsiness Score: 7  ESAS Lack of Appetite Score: 5  ESAS Wellbeing Score: 3  ESAS Dyspnea Score: No shortness of breath  ESAS Other Problem Score: Best possible response  ESAS Source of Information: healthcare professional caregiver  ESAS Intervention: medicated/see MAR  ESAS Intervention Response: tolerated    Vital Signs  Temp:  [97.6 °F (36.4 °C)-98 °F (36.7 °C)] 98 °F (36.7 °C)  Heart Rate:  [76-82] 82  Resp:  [18-20] 20  BP: ()/(57-67) 133/67  Device (Oxygen Therapy): nasal cannulaFlow (L/min):  [2] 2SpO2:  [97 %-100 %] 97 %    Physical Exam:  General Appearance:    Awakened and appears in no acute distress, knows he is in the hospital, but tires easily    Throat:   No oral lesions, oral mucosa moist   Neck:   No adenopathy, supple, trachea midline   Lungs:     Clear to auscultation with occasional crackle, respirations regular and not labored    Heart:    Regular rhythm and normal rate   Abdomen:     Occasional bowel sounds, soft and non-tender, non-distended   Extremities:   No edema, no cyanosis   Pulses:   Radial pulses palpable and equal bilaterally          Results Review:     I reviewed the patient's new clinical results.    Medication Reviewed.    Assessment/Plan       SAH (subarachnoid hemorrhage) (CMS/[facility])    Palliative care by  specialist    Acquired obstructive hydrocephalus (CMS/HCC)    Ruptured aneurysm of intracranial region (CMS/HCC)    Oropharyngeal dysphagia    COPD (chronic obstructive pulmonary disease) (CMS/HCC)    Hypertension    Chronic respiratory failure with hypoxia (CMS/[facility])    At the time of my examination, the patient was awakened.  The patient has had some complaint of headache.  Earlier, he complained of dizziness and that he could not feel his legs?  He is weak.  He has been talkative and his daughter describes that he has been somewhat confused at times.  Other times he has been clear. The patient has not received any morphine thus far today.    At this time, based on neurology and neurosurgery reevaluation and the patient's present condition and condition prior to admission, tentatively, plans are to discharge to home tomorrow with 24/7 care.  Hospice will follow at home.  Bentley catheter discontinued earlier today.  Will consider home Friday if he remains as is?      Plan for disposition:  Pending    Edwardo Loera MD  Hospice and Palliative Medicine  03/19/20  19:57

## 2020-03-19 NOTE — PLAN OF CARE
Problem: Patient Care Overview  Goal: Plan of Care Review  Outcome: Ongoing (interventions implemented as appropriate)  Flowsheets  Taken 3/19/2020 0447 by Ciarra Hester RN  Progress: no change  Taken 3/19/2020 1616 by Coby Stratton RN  Plan of Care Reviewed With: patient;family  Outcome Summary: Took over at 1200. DC'd machuca, gave miralax per MAR and family request. One episode of dizziness this shift, assisted back to bed and he quickly fell asleep. Family at bedside, no needs/complaints.

## 2020-03-19 NOTE — CONSULTS
I ran into pt family in [facility]. Pt family visibly upset. Upon further inquiry I learned that pt family was upset about the possibility of not being able to see pt due to hospital visitor policy. I listened reflectively and offered support. Pt family appreciative.

## 2020-03-19 NOTE — PLAN OF CARE
Problem: Patient Care Overview  Goal: Plan of Care Review  Outcome: Ongoing (interventions implemented as appropriate)  Flowsheets (Taken 3/19/2020 9110)  Progress: no change  Plan of Care Reviewed With: patient; son  Note:   PRN pain meds admin SL for pt c/o headache, pt appears to have rested/slept comfortably since med admin, no further c/o. Med admin PO and briana well. Pt remains confused, oriented to self and place, occasionally oriented to self only. Pt makes confused statements, pt pleasantly confused. Son at bsd through shift. Continue to monitor and tx per POC and MD orders.

## 2020-03-19 NOTE — PROGRESS NOTES
Discharge Planning Assessment  [facility]     Patient Name: Justin Pederson  MRN: [id number]  Today's Date: 3/19/2020    Admit Date: 3/16/2020    Discharge Needs Assessment    No documentation.       Discharge Plan     Row Name 03/19/20 1249       Plan    Plan Comments  Spoke with Raffi/son and Usha/daughter and the discharge plan is home with [facility] tomorrow with the family to provide 24/7 care along with [facility] services. The family will provide the transportation back to the patient's house at discharge. The family state they need hospital bed full electric with rails, BSC, transport wheel chair, oxygen, bedside table and pads. Notified [facility] and they have placed this patient on Jaci's schedule to arrange discharge tomorrow. Daugahter will be at dad's house to receive DME. Son will be here to meet with [facility] and sign all consents. COLLETTE Pérez RN, CCP.         Destination      Coordination has not been started for this encounter.      Durable Medical Equipment      Coordination has not been started for this encounter.      Dialysis/Infusion      Coordination has not been started for this encounter.      Home Medical Care      Service Provider Request Status Selected Services Address Phone Number Fax Number    [facility] Accepted N/A [address], [facility] [id number] [phone]      Therapy      Coordination has not been started for this encounter.      Community Resources      Coordination has not been started for this encounter.          Demographic Summary    No documentation.       Functional Status    No documentation.       Psychosocial    No documentation.       Abuse/Neglect    No documentation.       Legal    No documentation.       Substance Abuse    No documentation.       Patient Forms    No documentation.           Julissa Pérez RN

## 2020-03-19 NOTE — PLAN OF CARE
Problem: Patient Care Overview  Goal: Plan of Care Review  Outcome: Ongoing (interventions implemented as appropriate)  Flowsheets  Taken 3/18/2020 2033  Progress: improving  Taken 3/18/2020 0831  Plan of Care Reviewed With: patient   Patient medicated x1 for pain this morning. Patient up to chair most of the day. Family at bedside. AOx1. [facility] Home meds restarted per MD orders. Speech reeval patient today, see note. Will continue to monitor patient and treat according to MD orders.

## 2020-03-20 PROBLEM — I60.9 SUBARACHNOID HEMORRHAGE (HCC): Status: ACTIVE | Noted: 2020-01-01

## 2020-03-20 NOTE — PLAN OF CARE
Pt has c/o headache when he coughs. Medicated with Roxanol x2 with fair results. Pt states pain is only with coughing. Confused but pleasant. Pt unable to void. Bladder scan done d/t pt c/o bladder fullness/discomfort (459mL). In and out cath done and pt voiced relief. Pt has periods of restlessness where he attempts to climb out of bed unassisted. Bed alarm in use and son at bedside. No falls. Possible d/c home today with [facility] to follow.

## 2020-03-20 NOTE — PLAN OF CARE
Problem: Patient Care Overview  Goal: Plan of Care Review  Outcome: Ongoing (interventions implemented as appropriate)  Flowsheets (Taken 3/20/2020 1730)  Progress: no change  Plan of Care Reviewed With: son; daughter  Outcome Summary: PT was unresponsive upon assessment but appeared comfortable. Pt son states yest and day before he was up to chair and talking. Wants to refrain from pain meds if he doesn't request or state he is in pain. Pt is retaining urine and was straight cathed at 0600 with 375 out. pt still unable to void and not alert enough today to take oral medications much less eat or drink. Pt did perk up during his bath today but then promptly fell back to sleep afterward. Pt stirs every now and then continues w/o c/o of pain. FC placed for urine retention today and son ok with leaving it in. HSB now and monitoring pt decline. Will continue to monitor.

## 2020-03-20 NOTE — PROGRESS NOTES
Case Management Discharge Note      Final Note: Admitted to a [facility] scattered bed on 3/20/20. COLLETTE Pérez RN, CCP.     Provided Post Acute Provider List?: Yes  Post Acute Provider List: Hospice, Nursing Home, Long Term Acute Care, Home Health  Provided Post Acute Provider Quality & Resource List?: Yes  Post Acute Provider Quality and Resource List: Home Health, Nursing Home, Long Term Acute Care  Delivered To: Support Person  Support Person: Sydnie Brown/yogeshiend, Raffi Pederson/son, and Usha San/daughter  Method of Delivery: In person    Destination - Selection Complete      Service Provider Request Status Selected Services Address Phone Number Fax Number    [facility] Selected Inpatient Hospice [address], [place] [id number] [phone]      Durable Medical Equipment      No service has been selected for the patient.      Dialysis/Infusion      No service has been selected for the patient.      Home Medical Care      No service has been selected for the patient.      Therapy      No service has been selected for the patient.      Community Resources      No service has been selected for the patient.        Transportation Services  Private: Car    Final Discharge Disposition Code: 51 - hospice medical facility

## 2020-03-20 NOTE — PROGRESS NOTES
Discharge Planning Assessment  [facility]     Patient Name: Justin Pederson  MRN: [id number]  Today's Date: 3/20/2020    Admit Date: 3/16/2020    Discharge Needs Assessment    No documentation.       Discharge Plan     Row Name 03/20/20 1229       Plan    Plan Comments  Dr. Loera spoke with the family and the patient is not safe to transfer at this time. Jaci/Elisa/RN is here to admit the patient to a [facility] scattered bed on 3/20/20. COLLETTE Pérez RN,CCP         Destination - Selection Complete      Service Provider Request Status Selected Services Address Phone Number Fax Number    [facility] Selected Inpatient Hospice [address], [place] [id number] [phone]      Durable Medical Equipment      Coordination has not been started for this encounter.      Dialysis/Infusion      Coordination has not been started for this encounter.      Home Medical Care      Service Provider Request Status Selected Services Address Phone Number Fax Number    [facility] Accepted N/A [address], [place] [address] [phone]      Therapy      Coordination has not been started for this encounter.      Community Resources      Coordination has not been started for this encounter.        Expected Discharge Date and Time     Expected Discharge Date Expected Discharge Time    Mar 20, 2020         Demographic Summary    No documentation.       Functional Status    No documentation.       Psychosocial    No documentation.       Abuse/Neglect    No documentation.       Legal    No documentation.       Substance Abuse    No documentation.       Patient Forms    No documentation.           Julissa Pérez RN

## 2020-03-20 NOTE — CONSULTS
Met with patients son and due to change in patient he wants to keep patient in hospital to manage symptoms. Spoke to [facility] MD and patient is eligible for HSB/ HICC. Son signed consents for HSB. Written material provided. [facility] ID [id number]. Primary diagnosis for [facility] I60.9.    Thank you for the referral.    Jaci Mcnally RN  [facility]  [phone]

## 2020-03-21 PROBLEM — Z51.5 HOSPICE CARE PATIENT: Status: ACTIVE | Noted: 2020-01-01

## 2020-03-21 NOTE — DISCHARGE SUMMARY
Date of Admission:   3/16/2020  Date of Discharge:   3/20/2020    Patient Care Team:  Raffi Wooten MD as PCP - General (Emergency Medicine)  Edwardo Loera MD as Consulting Physician (Hospice and Palliative Medicine)    Discharge Diagnosis:     SAH (subarachnoid hemorrhage) (CMS/HCC)    Palliative care by specialist    Acquired obstructive hydrocephalus (CMS/HCC)    Ruptured aneurysm of intracranial region (CMS/HCC)    Oropharyngeal dysphagia    COPD (chronic obstructive pulmonary disease) (CMS/HCC)    Hypertension    Chronic respiratory failure with hypoxia (CMS/[facility])      Hospital Course  Patient is a 86 y.o. male who presented to the ED 0709 3/16/2020 with complaints of a headache.  Per girlfriend at bedside, patient went to the bathroom at approximately 4 AM.  When he did not return, she went to the bathroom and found him laying on the floor.  Patient had lost control of bowels. Patient was complaining of severe headache and seemed confused.  Girlfriend stated patient was at baseline when he went to bed, no complaints.  However, the night before he was somewhat restless. Patient is not on a blood thinner or aspirin.     CT head 3/16/2020 at 6:30 AM:  IMPRESSION:  1. There is very extensive acute subarachnoid hemorrhage filling the upper cervical spinal canal, the perimesencephalic and prepontine and interpeduncular cistern and suprasellar cistern, as well as the sylvian  fissures, and extending into the frontoparietal sulci, hemorrhage in lateral ventricles partially opacifying the third ventricle, aqueduct Sylvius, fourth ventricle, foramina of Luschka and Magendie, and there is some dilatation lateral and third ventricles, some of which may be due to central volume loss, although I suspect there is developing hydrocephalus. Some early transependymal extension of CSF in the periventricular white matter is also suspected. This is highly suspicious for ruptured aneurysm.     Neurosurgery reviewed the  patient and films.  I discussed with the patient's son and daughter who is POA.  They were in agreement that no heroic measures be done.  They elected for comfort care only and I was called.    1st hospital day, the patient was awake and alert. The patient was taking some PO. Oropharyngeal dysphagia noted. Speech therapy evaluated the patient. Mechanical soft with thickened liquids recommended.        Due to improvement, neurology and neurosurgery evaluated the patient again. Initially, plans were to discharge to home with 24/7 care. However, today, he has not been as awake. He would not respond to questions. He did not allow me to open his eyes easily to look at his pupils, not fighting, just resisting. He moved all extremities. He did not appear SOA.    Due to his worsening condition, hospice evaluated and agreed to admit his as a hospice scattered bed. He was discharged from acute care.     I reviewed all with his son at bedside.    Procedures Performed: None       Consults:   Consults     Date and Time Order Name Status Description    3/18/2020 1228 Inpatient Neurology Consult Other (see comments) Completed     3/16/2020 0814 IP General Consult (Use specialty-specific consult if known) Completed           Pertinent Test Results: Reviewed    Condition on Discharge:  Fair    Palliative Performance Scale  Palliative Performance Scale Score: 20%  [place] Symptom Assessment System Revised  Pain Score: no pain   ESAS Tiredness Score: 7  ESAS Nausea Score: No nausea  ESAS Depression Score: No depression  ESAS Anxiety Score: 4  ESAS Drowsiness Score: 9  ESAS Lack of Appetite Score: 7  ESAS Wellbeing Score: 3  ESAS Dyspnea Score: No shortness of breath  ESAS Other Problem Score: Best possible response  ESAS Source of Information: family caregiver, healthcare professional caregiver  ESAS Intervention: other (see comment)(offered meds but family refused)  ESAS Intervention Response: tolerated    Vital Signs  Temp:  [97.7  °F (36.5 °C)-101.3 °F (38.5 °C)] 101.3 °F (38.5 °C)  Heart Rate:  [] 72  Resp:  [16-20] 20  BP: (136-142)/(64-96) 136/64  Device (Oxygen Therapy): nasal cannulaFlow (L/min):  [2] 2SpO2:  [98 %-99 %] 99 %    Physical Exam:  General Appearance:    Not awake and appears in no acute distress, pupils 2-3 mm and appears reactive   Throat:   No oral lesions, oral mucosa somewhat dry   Neck:   No adenopathy, supple, trachea midline   Lungs:     Clear to auscultation with occasional crackle, respirations regular and not labored    Heart:    Regular rhythm and normal rate   Abdomen:     Occasional bowel sounds, soft and non-tender, non-distended   Extremities:   No edema, no cyanosis   Pulses:   Radial pulses palpable and equal bilaterally            Discharge Disposition  Hospice/Medical Facility ([facility])    Discharge Medications: Same MAR    Discharge Diet: As tolerated      Activity at Discharge: As tolerated      Follow-up Appointments  No future appointments.      Test Results Pending at Discharge: None       Edwardo Loera MD  03/20/20  20:44    Time: Discharge 40 min

## 2020-03-21 NOTE — H&P
Palliative Care/Hospice Admit/Consult Note       Referring Provider: Zhen Gonzales MD  Reason for Consultation: Palliative/hospice care  Date of Admission:  3/20/2020    Patient Care Team:  Raffi Wooten MD as PCP - General (Emergency Medicine)  Edwardo Loera MD as Consulting Physician (Hospice and Palliative Medicine)    Chief complaint:  SAH    History of present illness:  The patient is a 86 y.o. male who presented to the ED 0709 3/16/2020 with complaints of a headache.  Per girlfriend at bedside, patient went to the bathroom at approximately 4 AM.  When he did not return, she went to the bathroom and found him laying on the floor.  Patient had lost control of bowels. Patient was complaining of severe headache and seemed confused.  Girlfriend stated patient was at baseline when he went to bed, no complaints.  However, the night before he was somewhat restless. Patient is not on a blood thinner or aspirin.    CT head 3/16/2020 at 6:30 AM:  IMPRESSION:  1. There is very extensive acute subarachnoid hemorrhage filling the upper cervical spinal canal, the perimesencephalic and prepontine and interpeduncular cistern and suprasellar cistern, as well as the sylvian  fissures, and extending into the frontoparietal sulci, hemorrhage in lateral ventricles partially opacifying the third ventricle, aqueduct Sylvius, fourth ventricle, foramina of Luschka and Magendie, and there is some dilatation lateral and third ventricles, some of which may be due to central volume loss, although I suspect there is developing hydrocephalus. Some early transependymal extension of CSF in the periventricular white matter is also suspected. This is highly suspicious for ruptured aneurysm.    Neurosurgery reviewed the patient and films.  I discussed with the patient's son and daughter who is POA.  They were in agreement that no heroic measures be done.  They elected for comfort care only and I was called.    1st hospital day,  the patient was awake and alert. The patient was taking some PO. Oropharyngeal dysphagia noted. Speech therapy evaluated the patient. Mechanical soft with thickened liquids recommended.         Due to improvement, neurology and neurosurgery evaluated the patient again. Initially, plans were to discharge to home with 24/7 care. However, today, he has not been as awake. He would not respond to questions. He did not allow me to open his eyes easily to look at his pupils, not fighting, just resisting. He moved all extremities. He did not appear SOA.     Due to his worsening condition, hospice evaluated and agreed to admit his as a hospice scattered bed. He was discharged from acute care.     At the time of my evaluation today, I reviewed with the patient's daughter at bedside.  For the most part, patient has not been awake and has not been verbalizing.  Rarely he might.  After introducing myself, even those eyes closed he did hold out his hand to shake mine.  The patient was not verbal with me.  No ROS obtainable.      Review of Systems  Pertinent items are noted in HPI    Palliative Performance Scale  Palliative Performance Scale Score: 20%  [place] Symptom Assessment System Revised  Pain Score: no pain   ESAS Tiredness Score: 8  ESAS Nausea Score: No nausea  ESAS Depression Score: unable to assess  ESAS Anxiety Score: No anxiety  ESAS Drowsiness Score: 8  ESAS Lack of Appetite Score: unable to assess  ESAS Wellbeing Score: unable to assess  ESAS Dyspnea Score: No shortness of breath  ESAS Other Problem Score: unable to assess  ESAS Source of Information: healthcare professional caregiver  ESAS Intervention: medicated/see MAR  ESAS Intervention Response: tolerated    History  Past Medical History:   Diagnosis Date   • Chronic kidney disease    • COPD (chronic obstructive pulmonary disease) (CMS/[facility])    • Disease of thyroid gland    • Emphysema lung (CMS/[facility])    • GERD (gastroesophageal reflux disease)    •  Hypertension    • Psoriasis    ,   Past Surgical History:   Procedure Laterality Date   • BONE MARROW BIOPSY     • COLONOSCOPY     • ENDOSCOPY     • HERNIA REPAIR     , No family history on file. and   Social History     Tobacco Use   • Smoking status: Former Smoker   • Smokeless tobacco: Never Used   Substance Use Topics   • Alcohol use: No     Frequency: Never   • Drug use: Not on file       Vital Signs   Temp:  [100.9 °F (38.3 °C)-101.3 °F (38.5 °C)] 100.9 °F (38.3 °C)  Heart Rate:  [72-84] 84  Resp:  [20] 20  BP: (126-136)/(64-76) 126/76  Device (Oxygen Therapy): nasal cannulaFlow (L/min):  [2] 2SpO2:  [99 %-100 %] 100 %    Physical Exam:  General Appearance:    Appears not awake, cannot open eyes, seems aware at times, and appears in no acute distress   Head:    Normocephalic, without obvious abnormality, atraumatic   Eyes:            Lids and lashes normal, conjunctivae and sclerae normal, no   icterus   Ears:    Ears appear intact with no abnormalities noted   Throat:   No oral lesions, oral mucosa moist   Neck:   No adenopathy, supple, trachea midline, no thyromegaly       Lungs:     Clear to auscultation, respirations diminished and regular and not labored    Heart:    Regular rhythm and normal rate       Abdomen:   Occasional bowel sounds, soft and non-tender, non-distended       Extremities:  No edema, no cyanosis    Pulses:  Radial pulses palpable and equal bilaterally   Skin:   No bleeding         Neurologic:   Appears to move all extremities      Results Review:   I reviewed the patient's new clinical results.      Impression:      SAH (subarachnoid hemorrhage) (CMS/[facility])    Hospice care patient    Acquired obstructive hydrocephalus (CMS/[facility])    Ruptured aneurysm of intracranial region (CMS/H[facility])    Oropharyngeal dysphagia    COPD (chronic obstructive pulmonary disease) (CMS/[facility])    Hypertension    Chronic respiratory failure with hypoxia (CMS/[facility])        Plan:  I reviewed all with the patient's daughter  at bedside.  The patient was not discharged to home and instead was admitted as a hospice scattered bed.  The patient's condition worsened.  I reviewed with the RN.  The patient has not awakened at times.  His p.o. intake is down.  Other times, he seems aware.  Rarely, has he verbalized.  The family understands the patient's terminal condition.  The patient has received glycopyrrolate for airway congestion.  The patient has required 1 dose of 4 mg morphine and 1 dose of 0.5 mg Ativan thus far today.  Continue medications for symptom management to provide comfort.      Edwardo Loera MD  Hospice and Palliative Medicine  03/21/20  15:58

## 2020-03-21 NOTE — PROGRESS NOTES
"[facility] Visit Report    Justin Pederson  [id number]  3/21/2020    Admission R/T [facility] Dx: Yes    Reason for [facility] Admission: Non traumatic Subarachnoid Hemorrhage    Symptom  Management: Pain control, anxiety and congestion    Nursing/Medication Recommendations: Monitor closely for s/s of further decline    Psychosocial Issues and Recommendations:    Spiritual Concerns and Recommendations:    [facility] Discharge Plans:  No orders for discharge at this time. Patient is a PPS of 20% and continues with decline. He is receiving IV medications at this time for symptom management of pain control, anxiety and congestion    Review of Visit: Reviewed v/s, medications and notes in Epic. Initially I am unable to receive an update from facility RN. Upon arrival to room, patient is lying in bed with his eyes closed with no s/s of distress. His daughter, Carlie, is at the bedside. Carlie updates me on how patient got here to [facility]. Patient is somewhat responsive while Carlie and I are talking, and actually is able to tell me that he is having pain. He is unable to voice, however he is mouthing simple words for me. He is also shifting in the bed like his back or bottom is hurting and he is uncomfortable. He is noted with congestion with cough. His lungs are clear bilaterally. He is on O2 at 2L/NC for comfort. Abdomen is soft with hypoactive bowel sounds. F/C is present with clear, yellow urine noted. Pedal pulses are palpable. During my visit Mr Pederson mouths \"water\"; Carlie brings over some thickened apple juice and holds to his lips and he is able to sip the juice. After the 3rd sip he begins to cough and is attempting to sit up in the bed. He is consoled by voice. I left the room to find his nurse; at the desk I updated Zhen on events that took place in the room; then I  asked Zhen if he can medicate patient and then I will help him turn Mr Pederson. I returned to room and updated Carlie on the plan for medications and turn, she v/u. We also " discussed trying to give Mr Dacia sips via spoon and she also v/u. Zhen entered room and medicated patient. I assisted Zhen in turning patient to his right side. Daughter denies any needs at this time. Since midnight patient has received Ativan 0.5mg IV PRN x2 doses, Morphine 4mg IV PRN x2 doses and Robinul 0.4mg IV PRN x1 dose. Will continue to visit daily, assess needs of patient/family and provide support        Teri Pradhan RN  [facility] Visit Nurse

## 2020-03-21 NOTE — PLAN OF CARE
Problem: Patient Care Overview  Goal: Plan of Care Review  Outcome: Ongoing (interventions implemented as appropriate)  Flowsheets (Taken 3/21/2020 4609)  Progress: no change  Plan of Care Reviewed With: daughter  Outcome Summary: Patient was medicated with Ativan 0.5 mg IV and Morphine 4mg X1. Granddaughter at bedside. Bed alarm on. Patient gets anxious when medication wears out. Maintained comfort measures per palliative care protocol. Will continue to monitor vital signs and comfort.

## 2020-03-22 NOTE — PROGRESS NOTES
Palliative Care/Hospice Follow Up Note       LOS: 2 days   Patient Care Team:  Raffi Wooten MD as PCP - General (Emergency Medicine)  Edwardo Loera MD as Consulting Physician (Hospice and Palliative Medicine)    Chief Complaint:  SAH    Interval History:     Patient Complaints: None   Patient Denies:  None  History taken from:  Son and RN; hospice RN note from yesterday reviewed    Review of Systems:  As above.    Palliative Performance Scale  Palliative Performance Scale Score: 20%  [place] Symptom Assessment System Revised  Pain Score: no pain   ESAS Tiredness Score: 8  ESAS Nausea Score: No nausea  ESAS Depression Score: unable to assess  ESAS Anxiety Score: No anxiety  ESAS Drowsiness Score: 8  ESAS Lack of Appetite Score: unable to assess  ESAS Wellbeing Score: unable to assess  ESAS Dyspnea Score: No shortness of breath  ESAS Other Problem Score: unable to assess  ESAS Source of Information: healthcare professional caregiver  ESAS Intervention: medicated/see MAR  ESAS Intervention Response: tolerated    Vital Signs  Temp:  [97.9 °F (36.6 °C)-101.2 °F (38.4 °C)] 97.9 °F (36.6 °C)  Heart Rate:  [82-98] 98  Resp:  [12-14] 12  BP: (113-136)/(59-66) 136/66  Device (Oxygen Therapy): nasal cannulaFlow (L/min):  [2] 2SpO2:  [97 %-99 %] 97 %    Physical Exam:  General Appearance:   Not awake and appears in no acute distress   Throat:   No oral lesions, oral mucosa moist   Neck:   No adenopathy, supple, trachea midline   Lungs:     Clear to auscultation with occasional crackle, respirations regular and not labored    Heart:    Regular rhythm and normal rate   Abdomen:     Occasional bowel sounds, soft and non-tender, non-distended   Extremities:   No edema, no cyanosis, arms cool greater than legs   Pulses:   Radial pulses palpable and equal bilaterally          Results Review:     I reviewed the patient's new clinical results.    Medication Reviewed.    Assessment/Plan       SAH (subarachnoid hemorrhage)  (CMS/[facility])    Hospice care patient    Acquired obstructive hydrocephalus (CMS/[facility])    Ruptured aneurysm of intracranial region (CMS/[facility])    Oropharyngeal dysphagia    COPD (chronic obstructive pulmonary disease) (CMS/[facility])    Hypertension    Chronic respiratory failure with hypoxia (CMS/[facility])    At the time of my examination, the patient did not awaken as before.  I reviewed with the patient's son at bedside.  The patient presently appears comfortable.  The patient received glycopyrrolate yesterday.  The patient has received 1 dose of 4 mg morphine (2 doses yesterday) and 1 dose of 0.5 mg Ativan (2 doses yesterday) thus far today.  I reviewed the history of subarachnoid hemorrhage that is been explained to the patient's son who is at bedside.  Presently, the patient's condition is consistent with his diagnosis.  I would expect further decline.  I answered the son's questions.      Plan for disposition:  Pending    Edwardo Loera MD  Hospice and Palliative Medicine  03/22/20  11:45

## 2020-03-22 NOTE — PLAN OF CARE
Problem: Patient Care Overview  Goal: Plan of Care Review  Outcome: Ongoing (interventions implemented as appropriate)    Medicated x1 for comfort. Family at bedside. Will cont. To monitor and follow current orders.

## 2020-03-22 NOTE — PLAN OF CARE
Problem: Patient Care Overview  Goal: Plan of Care Review  Outcome: Ongoing (interventions implemented as appropriate)  Flowsheets (Taken 3/22/2020 3671)  Progress: declining  Plan of Care Reviewed With: patient; family  Outcome Summary: Pt. continued with palliative care.  Medicated with 4mg of morphine, 0.5mg of ativan, and 0.4mg or robinul for pain, restlessness, and congestion.  Pt. is more shallow with breathing.  Family at bedside throughout the day.  Will continue to monitor.

## 2020-03-22 NOTE — PROGRESS NOTES
"[facility] Visit Report    Justin Pederson  [id number]  3/22/2020    Admission R/T [facility] Dx: Yes    Reason for [facility] Admission: Non traumatic Subarachnoid Hemorrhage    Symptom  Management: Pain control, anxiety and congestion    Nursing/Medication Recommendations: At this time I recommend patient to be medicated with IV Morphine and IV Robinul for symptom management of pain and congestion    Psychosocial Issues and Recommendations:    Spiritual Concerns and Recommendations:    [facility] Discharge Plans:  No orders for discharge. Patient continues with decline and is requiring IV medications for symptom management of pain and congestion. Patient is a pps of 10% with oral care only, he is minimally responsive and has decreasing and darkening urine output. Will continue to monitor for s/s of further decline    Review of Visit: Reviewed v/s, medications and notes in Epic. Received an update from facility RN, Leah; she reports patient with decreased responsiveness today. Upon arrival to room Mr Pederson is lying in bed, he is turned to his left side and his eyes are closed. His son, Raffi, is at the bedside; I introduced myself to son. Raffi states he has not been able to get his father to respond to him so far this morning. He reports intermittent periods of congestion since last night. I called Mr Pederson' name and he nods his head \"yes\", he does not make any attempt to open his eyes. I asked him if he is in pain, he is able to get out a very weak \"yes\"; however he is unable to tell me anything else about his pain. He is much weaker than yesterday. He is now oral care only and is a PPS of 10%. He is noted with very mild audible congestion. His skin is warm to touch; his nailbeds show mildly dusky compared to yesterday. He remains on O2 at 2L/NC for comfort. F/C is present with now darkening urine, today he is noted with carmen urine to bedside drain. Per I&O's in Epic patient had 650cc u/o on day shift yesterday. " "Overnight he only had 250cc u/o. Will continue to monitor. His most recent v/s are: T 97.9, HR 98, RR 12, B/P 136/66, sats 97%. D/W son regarding patient receiving IV Robinul and IV Morphine at this time; Raffi v/u and says, \"whatever you think so that he is comfortable\". After my visit I called RN, Leah, and asked her to medicate patient, she v/u. In the last 24 hours patient has received Ativan 0.5mg IV PRN x1 dose and IV Morphine 4mg x1 dose. Will continue to visit daily, assess needs of patient/family and provide support         Teri Pradhan RN  [facility] Visit Nurse  "

## 2020-03-22 NOTE — PLAN OF CARE
Problem: Patient Care Overview  Goal: Plan of Care Review  Outcome: Ongoing (interventions implemented as appropriate)  Flowsheets (Taken 3/22/2020 0440)  Progress: declining  Plan of Care Reviewed With: son  Outcome Summary: Maintained comfort measures per palliative care protocol. Patient was medicated with Ativan 0.5mg Iv and Morphine 4mg IV x1 this shift. Son at bedside. Will continue to monitor vital signs and comfort.

## 2020-03-23 NOTE — PROGRESS NOTES
[facility]    Justin Pederson  [id number]  3/23/2020        Psychosocial Issues and Recommendations: Daughter Carlie  very tearful. Information on \[facility] [place] [facility]\"" Grief Counseling services provided. Also provided information that patient's SO can access these services. Daughter expressed appreciation.    Spiritual Concerns and Recommendations:    \[facility] [place] [facility]\"" Discharge Plans:  [facility] RN will assess daily for any discharge needs.    Review of Visit (Include All Collaboration- including names of hospital and family involved during admission/visit): I spoke with staff RN Susana about patient having a temperature of 100.6. No concerns noted as this is related to end-of-life/disease progression.Patient is unresponsive, pale, no PO intake, audible breathing. Daughter shared he was in the [employer] business (she was too.) Family is close. Anticipatory grief support provided.  arrangements are in place with [facility] in [place].        Erika Llamas LCSW   \[facility]\"" Clinical

## 2020-03-23 NOTE — PROGRESS NOTES
[facility]  Visit Report    Justin Pederson  [id number]  3/23/2020      Review of Visit (Include All Collaboration- including names of hospital and family involved during admission/visit):  [facility] collab with [facility] RN Susana, no concerns voiced;    Pt son Raffi at bedside with pt, pt non-responsive, appeared to be actively dying, Raffi feels his father is comfortable; pt is Oriental orthodox by yuriy tradition, son uncertain what [facility] pt and his signif other attended; CHP provided supportive active listening as Raffi briefly shared events over the last several days, including a few rally days giving family the opportunity for some closure; CHP discussed grief counseling availability, Raffi had no concerns, appreciated [facility] stopping by.      Sherman Arcos, BCC

## 2020-03-23 NOTE — PLAN OF CARE
Pt appears to actively dying. Unresponsive. Repositioned q 4 hrs. Medicated with morphine, ativan, and robinul x2. Pt appears calm and comfortable. F/C. Daughter at bedside. Will monitor

## 2020-03-23 NOTE — PLAN OF CARE
Problem: Patient Care Overview  Goal: Plan of Care Review  Outcome: Ongoing (interventions implemented as appropriate)  Flowsheets  Taken 3/23/2020 1614  Progress: declining  Outcome Summary: Pt medicated with 4mg morphine, 1mg ativan, and 0.4 robinul. Scop patch placed for secretions; sunction at bedside. Family aware of decline. Will cont to monitor  Taken 3/23/2020 4703  Plan of Care Reviewed With: daughter

## 2020-03-23 NOTE — PROGRESS NOTES
[facility] Visit Report    Justin Pederson  [id number]  3/23/2020    Admission R/T [facility] Dx: Yes    Reason for [facility] Admission: Nontraumatic Subarachnoid Homorrhage    Symptom  Management: Pain    Nursing/Medication Recommendations: Monitor for condition changes and medicate as directed.    Psychosocial Issues and Recommendations:    Spiritual Concerns and Recommendations:    [facility] Discharge Plans:  None.  Pt meets GIP criteria and is unsafe for transfer with [facility] to follow daily.    Review of Visit (Include All Collaboration:  Pt is a pps of 10% and appears to be in active dying process with shallow nonlabored respirations present and some apnea observed.  Pt current IV med regimen appears to be very effective and family is present and at bedside and verbalizes understanding of pt current condition with no concerns.  RN collaborated with Damian regarding pt current condition with both verbalizing understanding of pt being in active dying process.        Zak Fried RN

## 2020-03-24 NOTE — PROGRESS NOTES
Palliative Care/Hospice Follow Up Note       LOS: 3 days   Patient Care Team:  Raffi Wooten MD as PCP - General (Emergency Medicine)  Edwardo Loera MD as Consulting Physician (Hospice and Palliative Medicine)    Chief Complaint:  SAH    Interval History:     Patient Complaints: None   Patient Denies:  None  History taken from:  Son and RN; hospice RN note from today reviewed    Review of Systems:  As above.    Palliative Performance Scale  Palliative Performance Scale Score: 10%  [place] Symptom Assessment System Revised  Pain Score: no pain   ESAS Tiredness Score: Worst possible tiredness  ESAS Nausea Score: No nausea  ESAS Depression Score: unable to assess  ESAS Anxiety Score: No anxiety  ESAS Drowsiness Score: Worst possible drowsiness  ESAS Lack of Appetite Score: Worst lack of appetite  ESAS Wellbeing Score: 5  ESAS Dyspnea Score: No shortness of breath  ESAS Other Problem Score: 6(congestion)  ESAS Source of Information: healthcare professional caregiver  ESAS Intervention: medicated/see MAR  ESAS Intervention Response: tolerated    Vital Signs  Temp:  [100.6 °F (38.1 °C)] 100.6 °F (38.1 °C)  Heart Rate:  [75] 75  Resp:  [10] 10  BP: (111)/(66) 111/66  Device (Oxygen Therapy): nasal cannulaFlow (L/min):  [2] 2SpO2:  [96 %] 96 %    Physical Exam:  General Appearance:   Not awake and appears in no acute distress lying on his right side   Throat:   No oral lesions, oral mucosa moist   Neck:   No adenopathy, supple, trachea midline   Lungs:     Clear to auscultation with occasional crackle, minimal expiratory rhonchi, respirations irregular    Heart:    Regular rhythm and normal rate   Abdomen:     Occasional bowel sounds, soft and non-tender, non-distended   Extremities:   No edema, no cyanosis, arms cool greater than legs   Pulses:   Radial pulses palpable and equal bilaterally          Results Review:     I reviewed the patient's new clinical results.    Medication Reviewed.    Assessment/Plan        SAH (subarachnoid hemorrhage) (CMS/[facility])    Hospice care patient    Acquired obstructive hydrocephalus (CMS/HCC)    Ruptured aneurysm of intracranial region (CMS/HCC)    Oropharyngeal dysphagia    COPD (chronic obstructive pulmonary disease) (CMS/[facility])    Hypertension    Chronic respiratory failure with hypoxia (CMS/[facility])    At the time of my examination, the patient did not awaken.  I reviewed with the patient's son at bedside.  The patient presently appears comfortable.  The patient's respiratory status is different.  The patient has received glycopyrrolate for airway congestion.  The patient has received 1 dose of 2 mg and 2 doses of 4 mg morphine (4 doses yesterday) and 1 dose of 0.5 mg and 1 dose of 1 mg Ativan (3 doses yesterday) thus far today.  Presently, the patient's course and present condition is consistent with his diagnosis.  Gradual decline evident.       Plan for disposition:  LAKEISHA Loera MD  Hospice and Palliative Medicine  03/23/20  20:09

## 2020-03-24 NOTE — PROGRESS NOTES
Case Management Discharge Note      Final Note: The patient  on 3/23/2020 @ 21:35. BKeshawn Pérez RN, CCP.          Destination - Selection Complete      Service Provider Request Status Selected Services Address Phone Number Fax Number    [facility] [id number], [facility] [address] [phone]      Durable Medical Equipment      No service has been selected for the patient.      Dialysis/Infusion      No service has been selected for the patient.      Home Medical Care      No service has been selected for the patient.      Therapy      No service has been selected for the patient.      Community Resources      No service has been selected for the patient.             Final Discharge Disposition Code: 41 -  in medical facility